# Patient Record
Sex: FEMALE | Race: OTHER | HISPANIC OR LATINO
[De-identification: names, ages, dates, MRNs, and addresses within clinical notes are randomized per-mention and may not be internally consistent; named-entity substitution may affect disease eponyms.]

---

## 2017-07-19 PROBLEM — Z00.00 ENCOUNTER FOR PREVENTIVE HEALTH EXAMINATION: Status: ACTIVE | Noted: 2017-07-19

## 2020-10-13 ENCOUNTER — APPOINTMENT (OUTPATIENT)
Dept: DERMATOLOGY | Facility: CLINIC | Age: 40
End: 2020-10-13
Payer: COMMERCIAL

## 2020-10-13 PROCEDURE — 99203 OFFICE O/P NEW LOW 30 MIN: CPT

## 2020-10-27 ENCOUNTER — APPOINTMENT (OUTPATIENT)
Dept: ORTHOPEDIC SURGERY | Facility: CLINIC | Age: 40
End: 2020-10-27
Payer: COMMERCIAL

## 2020-10-27 VITALS
HEART RATE: 73 BPM | HEIGHT: 64 IN | SYSTOLIC BLOOD PRESSURE: 110 MMHG | WEIGHT: 137 LBS | DIASTOLIC BLOOD PRESSURE: 75 MMHG | BODY MASS INDEX: 23.39 KG/M2

## 2020-10-27 DIAGNOSIS — M24.812 OTHER SPECIFIC JOINT DERANGEMENTS OF LEFT SHOULDER, NOT ELSEWHERE CLASSIFIED: ICD-10-CM

## 2020-10-27 DIAGNOSIS — Z78.9 OTHER SPECIFIED HEALTH STATUS: ICD-10-CM

## 2020-10-27 PROCEDURE — 99203 OFFICE O/P NEW LOW 30 MIN: CPT

## 2020-10-27 PROCEDURE — 73030 X-RAY EXAM OF SHOULDER: CPT | Mod: LT

## 2020-10-27 PROCEDURE — 99072 ADDL SUPL MATRL&STAF TM PHE: CPT
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
## 2020-10-27 NOTE — PHYSICAL EXAM
[de-identified] : Physical Exam:\par General: Well appearing, no acute distress, A&O\par Neurologic: A&Ox3, No focal deficits\par Head: NCAT without abrasions, lacerations, or ecchymosis to head, face, or scalp\par Eyes: No scleral icterus, no gross abnormalities\par Respiratory: Equal chest wall expansion bilaterally, no accessory muscle use\par Lymphatic: No lymphadenopathy palpated\par Skin: Warm and dry\par Psychiatric: Normal mood and affect\par \par Left Shoulder\par ·	Inspection/Palpation: no tenderness, swelling or deformities\par ·	Range of Motion: no crepitus with ROM; Active ; ER at side 25; IR to back pocket; Passive  pain; ER at side 35 with pain; IR to L4\par ·	Strength: forward elevation in scapular plane [4/5], internal rotation [4/5], external rotation [4/5], adduction [4/5] and abduction [4/5]\par ·	Stability: no joint instability on provocative testing\par ·	Tests: Melton test positive, Neer positive, positive drop arm test secondary to pain, bear hug test positive, Napolean sign negative, cross arm adduction negative, lift off sign positive, hornblowers sign negative, speeds test negative, Yergason's test negative, no bicipital groove tenderness, Cates's Active Compression test negative, whipple test positive, bicep's load II test negative\par \par Right Shoulder\par ·	Inspection/Palpation: no tenderness, swelling or deformities\par ·	Range of Motion: full and painless in all planes, no crepitus\par ·	Strength: forward elevation in scapular plane 5/5, internal rotation 5/5, external rotation 5/5, adduction 5/5 and abduction 5/5\par ·	Stability: no joint instability on provocative testing\par ·	Tests: Melton test negative, Neer sign negative, negative drop arm test secondary to pain, bear hug test negative, Napolean sign negative, cross arm adduction negative, lift off sign positive, hornblowers sign negative, speeds test negative, Yergason's test negative, no bicipital groove tenderness, Cates's Active Compression test negative [de-identified] :  4 views of the left shoulder were performed today and available for me to review. They demonstrate no fracture or dislocation. [No] glenohumeral degenerative changes noted. [No] AC joint degenerative changes noted. No gross deformities noted.

## 2020-10-27 NOTE — DISCUSSION/SUMMARY
[de-identified] : Francesca is a 40-year-old female with worsening left shoulder pain pain x1 and half years.  She stated injury to her left shoulder March 2018.  She has attempted conservative measures with physical therapy cortisone injections and oral anti-inflammatories over the past year and a half and has had no relief.  At this time I recommend an MRI of the shoulder.  I will call with the results.  All questions were answered she agrees with the above plan plan.

## 2020-10-27 NOTE — REASON FOR VISIT
[Initial Visit] : an initial visit for [Family Member] : family member [FreeTextEntry2] : left shoulder pain.

## 2020-10-27 NOTE — REVIEW OF SYSTEMS
[Joint Pain] : joint pain [Joint Stiffness] : joint stiffness [Sleep Disturbances] : ~T sleep disturbances [FreeTextEntry9] : left shoulder

## 2020-11-19 ENCOUNTER — TRANSCRIPTION ENCOUNTER (OUTPATIENT)
Age: 40
End: 2020-11-19

## 2020-11-23 ENCOUNTER — TRANSCRIPTION ENCOUNTER (OUTPATIENT)
Age: 40
End: 2020-11-23

## 2020-11-30 ENCOUNTER — TRANSCRIPTION ENCOUNTER (OUTPATIENT)
Age: 40
End: 2020-11-30

## 2020-12-24 ENCOUNTER — TRANSCRIPTION ENCOUNTER (OUTPATIENT)
Age: 40
End: 2020-12-24

## 2020-12-30 ENCOUNTER — OUTPATIENT (OUTPATIENT)
Dept: OUTPATIENT SERVICES | Facility: HOSPITAL | Age: 40
LOS: 1 days | End: 2020-12-30

## 2020-12-30 ENCOUNTER — APPOINTMENT (OUTPATIENT)
Dept: MRI IMAGING | Facility: CLINIC | Age: 40
End: 2020-12-30
Payer: COMMERCIAL

## 2020-12-30 DIAGNOSIS — M24.812 OTHER SPECIFIC JOINT DERANGEMENTS OF LEFT SHOULDER, NOT ELSEWHERE CLASSIFIED: ICD-10-CM

## 2020-12-30 PROCEDURE — 73221 MRI JOINT UPR EXTREM W/O DYE: CPT | Mod: 26,LT

## 2021-01-11 ENCOUNTER — TRANSCRIPTION ENCOUNTER (OUTPATIENT)
Age: 41
End: 2021-01-11

## 2021-01-18 ENCOUNTER — TRANSCRIPTION ENCOUNTER (OUTPATIENT)
Age: 41
End: 2021-01-18

## 2021-01-25 ENCOUNTER — TRANSCRIPTION ENCOUNTER (OUTPATIENT)
Age: 41
End: 2021-01-25

## 2021-02-08 ENCOUNTER — APPOINTMENT (OUTPATIENT)
Dept: ORTHOPEDIC SURGERY | Facility: CLINIC | Age: 41
End: 2021-02-08
Payer: COMMERCIAL

## 2021-02-08 DIAGNOSIS — M67.912 UNSPECIFIED DISORDER OF SYNOVIUM AND TENDON, LEFT SHOULDER: ICD-10-CM

## 2021-02-08 PROCEDURE — 99214 OFFICE O/P EST MOD 30 MIN: CPT | Mod: 25

## 2021-02-08 PROCEDURE — 99072 ADDL SUPL MATRL&STAF TM PHE: CPT

## 2021-02-08 PROCEDURE — 20610 DRAIN/INJ JOINT/BURSA W/O US: CPT | Mod: LT

## 2021-02-08 NOTE — PROCEDURE
[de-identified] : Injection: Left shoulder (Subacromial).\par Indication: Rotator cuff tendinitis partial-thickness tear.\par \par A discussion was had with the patient regarding this procedure and all questions were answered. All risks, benefits and alternatives were discussed. These included but were not limited to bleeding, infection, and allergic reaction. Alcohol was used to clean the skin, and betadine was used to sterilize and prep the area in the posterior aspect of the left shoulder. Ethyl chloride spray was then used as a topical anesthetic. A 21-gauge needle was used to inject 4cc of 1% lidocaine and 1cc of 40mg/mL triamcinolone acetonide into the left subacromial space. A sterile bandage was then applied. The patient tolerated the procedure well and there were no complications.

## 2021-02-08 NOTE — DISCUSSION/SUMMARY
[de-identified] : Francesca is a 40-year-old female with left shoulder pain secondary to partial-thickness tearing of the supraspinatus.  Today after discussion with her and her daughter regarding the nature symptoms as well as all treatment options.  Discussed operative and nonoperative management.  She elected for cortisone injection today.  She tolerated procedure well.  I will see her back in 6 to 8 weeks for clinical reassessment.  A physical therapy prescription was also provided to her today.  With regards to her trapezial strain I believe physical therapy will offer significant benefit.  All questions were answered she agrees with above plan.

## 2021-02-08 NOTE — REVIEW OF SYSTEMS
[Joint Pain] : joint pain [Joint Stiffness] : joint stiffness [Sleep Disturbances] : ~T sleep disturbances [Negative] : Heme/Lymph [FreeTextEntry9] : left shoulder

## 2021-02-08 NOTE — PHYSICAL EXAM
[de-identified] : Physical Exam:\par General: Well appearing, no acute distress, A&O\par Neurologic: A&Ox3, No focal deficits\par Head: NCAT without abrasions, lacerations, or ecchymosis to head, face, or scalp\par Eyes: No scleral icterus, no gross abnormalities\par Respiratory: Equal chest wall expansion bilaterally, no accessory muscle use\par Lymphatic: No lymphadenopathy palpated\par Skin: Warm and dry\par Psychiatric: Normal mood and affect\par Cervical spine: Left shoulder appears to be sitting anteriorly lower than right shoulder.  Tenderness palpation over the trapezius muscles as well as paraspinals.\par Left Shoulder\par ·	Inspection/Palpation: no tenderness, swelling or deformities\par ·	Range of Motion: no crepitus with ROM; Active ; ER at side 25; IR to back pocket; Passive  pain; ER at side 35 with pain; IR to L4\par ·	Strength: forward elevation in scapular plane [4/5], internal rotation [4/5], external rotation [4/5], adduction [4/5] and abduction [4/5]\par ·	Stability: no joint instability on provocative testing\par ·	Tests: Melton test positive, Neer positive, positive drop arm test secondary to pain, bear hug test positive, Napolean sign negative, cross arm adduction negative, lift off sign positive, hornblowers sign negative, speeds test negative, Yergason's test negative, no bicipital groove tenderness, Cates's Active Compression test negative, whipple test positive, bicep's load II test negative\par \par Right Shoulder\par ·	Inspection/Palpation: no tenderness, swelling or deformities\par ·	Range of Motion: full and painless in all planes, no crepitus\par ·	Strength: forward elevation in scapular plane 5/5, internal rotation 5/5, external rotation 5/5, adduction 5/5 and abduction 5/5\par ·	Stability: no joint instability on provocative testing\par ·	Tests: Melton test negative, Neer sign negative, negative drop arm test secondary to pain, bear hug test negative, Napolean sign negative, cross arm adduction negative, lift off sign positive, hornblowers sign negative, speeds test negative, Yergason's test negative, no bicipital groove tenderness, Cates's Active Compression test negative [de-identified] :   \par MR Shoulder Joint No Cont, Left     \par \par IMPRESSION:\par 1. Mild to moderate rotator cuff tendinopathy with articular and bursal surface fraying of the supraspinatus and slight intrasubstance tearing of the infraspinatus.\par
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
## 2021-02-08 NOTE — REASON FOR VISIT
[Follow-Up Visit] : a follow-up visit for [Family Member] : family member [FreeTextEntry2] : L shoulder pain.

## 2021-03-29 ENCOUNTER — APPOINTMENT (OUTPATIENT)
Dept: ORTHOPEDIC SURGERY | Facility: CLINIC | Age: 41
End: 2021-03-29
Payer: COMMERCIAL

## 2021-03-29 VITALS
HEIGHT: 64 IN | HEART RATE: 81 BPM | DIASTOLIC BLOOD PRESSURE: 79 MMHG | WEIGHT: 137 LBS | BODY MASS INDEX: 23.39 KG/M2 | SYSTOLIC BLOOD PRESSURE: 117 MMHG | OXYGEN SATURATION: 98 %

## 2021-03-29 DIAGNOSIS — M75.112 INCOMPLETE ROTATOR CUFF TEAR OR RUPTURE OF LEFT SHOULDER, NOT SPECIFIED AS TRAUMATIC: ICD-10-CM

## 2021-03-29 DIAGNOSIS — S46.812A STRAIN OF OTHER MUSCLES, FASCIA AND TENDONS AT SHOULDER AND UPPER ARM LEVEL, LEFT ARM, INITIAL ENCOUNTER: ICD-10-CM

## 2021-03-29 PROCEDURE — 99072 ADDL SUPL MATRL&STAF TM PHE: CPT

## 2021-03-29 PROCEDURE — 99214 OFFICE O/P EST MOD 30 MIN: CPT

## 2021-03-29 NOTE — DISCUSSION/SUMMARY
[de-identified] : Francesca is a 41-year-old female who comes in for follow-up of her bilateral shoulder pain.  Last visit we attempted cortisone injections which gave her no relief in her shoulder.  She complains of significant posterior shoulder and upper neck pain as well.  I am concerned that her shoulder pain is likely referred pain at with cervical origin.  At this time recommend an MRI of the cervical spine and follow-up with my partner Dr. Gannon.  I will see her back as needed.  She demonstrates understanding plan all questions were answered.
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
## 2021-03-29 NOTE — PHYSICAL EXAM
[de-identified] : Physical Exam:\par General: Well appearing, no acute distress, A&O\par Neurologic: A&Ox3, No focal deficits\par Head: NCAT without abrasions, lacerations, or ecchymosis to head, face, or scalp\par Eyes: No scleral icterus, no gross abnormalities\par Respiratory: Equal chest wall expansion bilaterally, no accessory muscle use\par Lymphatic: No lymphadenopathy palpated\par Skin: Warm and dry\par Psychiatric: Normal mood and affect\par Cervical spine: Left shoulder appears to be sitting anteriorly lower than right shoulder.  Tenderness palpation over the trapezius muscles as well as paraspinals.\par Left Shoulder\par ·	Inspection/Palpation: no tenderness, swelling or deformities\par ·	Range of Motion: no crepitus with ROM; Active ; ER at side 25; IR to back pocket; Passive  pain; ER at side 35 with pain; IR to L4\par ·	Strength: forward elevation in scapular plane [4/5], internal rotation [4/5], external rotation [4/5], adduction [4/5] and abduction [4/5]\par ·	Stability: no joint instability on provocative testing\par ·	Tests: Melton test positive, Neer positive, positive drop arm test secondary to pain, bear hug test positive, Napolean sign negative, cross arm adduction negative, lift off sign positive, hornblowers sign negative, speeds test negative, Yergason's test negative, no bicipital groove tenderness, Cates's Active Compression test negative, whipple test positive, bicep's load II test negative\par \par Right Shoulder\par ·	Inspection/Palpation: no tenderness, swelling or deformities\par ·	Range of Motion: full and painless in all planes, no crepitus\par ·	Strength: forward elevation in scapular plane 5/5, internal rotation 5/5, external rotation 5/5, adduction 5/5 and abduction 5/5\par ·	Stability: no joint instability on provocative testing\par ·	Tests: Melton test negative, Neer sign negative, negative drop arm test secondary to pain, bear hug test negative, Napolean sign negative, cross arm adduction negative, lift off sign positive, hornblowers sign negative, speeds test negative, Yergason's test negative, no bicipital groove tenderness, Cates's Active Compression test negative

## 2021-04-21 ENCOUNTER — OUTPATIENT (OUTPATIENT)
Dept: OUTPATIENT SERVICES | Facility: HOSPITAL | Age: 41
LOS: 1 days | End: 2021-04-21

## 2021-04-21 ENCOUNTER — APPOINTMENT (OUTPATIENT)
Dept: MRI IMAGING | Facility: CLINIC | Age: 41
End: 2021-04-21
Payer: COMMERCIAL

## 2021-04-21 DIAGNOSIS — M54.12 RADICULOPATHY, CERVICAL REGION: ICD-10-CM

## 2021-04-21 PROCEDURE — 72141 MRI NECK SPINE W/O DYE: CPT | Mod: 26

## 2021-04-25 ENCOUNTER — EMERGENCY (EMERGENCY)
Facility: HOSPITAL | Age: 41
LOS: 1 days | Discharge: HOME | End: 2021-04-25
Attending: EMERGENCY MEDICINE
Payer: COMMERCIAL

## 2021-04-25 VITALS
WEIGHT: 143.08 LBS | HEIGHT: 60 IN | HEART RATE: 65 BPM | RESPIRATION RATE: 18 BRPM | DIASTOLIC BLOOD PRESSURE: 80 MMHG | OXYGEN SATURATION: 100 % | TEMPERATURE: 98 F | SYSTOLIC BLOOD PRESSURE: 123 MMHG

## 2021-04-25 PROCEDURE — 99283 EMERGENCY DEPT VISIT LOW MDM: CPT

## 2021-04-25 PROCEDURE — 99284 EMERGENCY DEPT VISIT MOD MDM: CPT

## 2021-04-25 PROCEDURE — 99053 MED SERV 10PM-8AM 24 HR FAC: CPT

## 2021-04-25 RX ADMIN — Medication 1 TABLET(S): at 22:26

## 2021-04-25 NOTE — ED PROVIDER NOTE - PATIENT PORTAL LINK FT
You can access the FollowMyHealth Patient Portal offered by [facility] by registering at the following website: [email or web address]. By joining [facility]’s FollowMyHealth portal, you will also be able to view your health information using other applications (apps) compatible with our system.
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
## 2021-05-12 ENCOUNTER — APPOINTMENT (OUTPATIENT)
Dept: PHYSICAL MEDICINE AND REHAB | Facility: CLINIC | Age: 41
End: 2021-05-12

## 2021-05-24 ENCOUNTER — APPOINTMENT (OUTPATIENT)
Dept: PHYSICAL MEDICINE AND REHAB | Facility: CLINIC | Age: 41
End: 2021-05-24
Payer: COMMERCIAL

## 2021-05-24 VITALS
BODY MASS INDEX: 23.39 KG/M2 | SYSTOLIC BLOOD PRESSURE: 110 MMHG | TEMPERATURE: 97 F | WEIGHT: 137 LBS | RESPIRATION RATE: 14 BRPM | HEART RATE: 57 BPM | OXYGEN SATURATION: 98 % | HEIGHT: 64 IN | DIASTOLIC BLOOD PRESSURE: 70 MMHG

## 2021-05-24 DIAGNOSIS — Z78.9 OTHER SPECIFIED HEALTH STATUS: ICD-10-CM

## 2021-05-24 PROCEDURE — 99204 OFFICE O/P NEW MOD 45 MIN: CPT

## 2021-05-24 RX ORDER — METHYLPREDNISOLONE 4 MG/1
4 TABLET ORAL
Qty: 1 | Refills: 0 | Status: DISCONTINUED | COMMUNITY
Start: 2021-03-29 | End: 2021-05-24

## 2021-05-24 RX ORDER — GABAPENTIN 100 MG/1
100 CAPSULE ORAL
Qty: 90 | Refills: 0 | Status: ACTIVE | COMMUNITY
Start: 2021-05-24 | End: 1900-01-01

## 2021-05-24 NOTE — ASSESSMENT
[FreeTextEntry1] : Ms. WILIAM DE LOS SANTOS is a 41 year old female who presents with persistent neck/shoulder/upper back pain, much worse on the left, likely related to an underlying cervical radiculopathy. Denies any red flag signs. Will recommend:\par - Will start trial of Gabapentin 100mg Qhs with gradual increase to 300mg Qhs. Patient aware of side effects and risks including sedation, leg swelling, and possible mood changes. \par - Briefly discussed SAJAN with patient for which she would like to hold off for now until we try the Gabapentin\par \par RTC in 2 weeks. Patient aware of red flag signs including any changes to their bowel/bladder control, groin numbness or new weakness. Patient knows to seek immediate attention by calling 911 or going to nearest ER if these symptoms appear.

## 2021-05-24 NOTE — PHYSICAL EXAM
[FreeTextEntry1] : PE:\par Constitutional: In NAD, calm and cooperative\par HEENT: NCAT, Anicteric sclera, no lid-lag\par Cardio: Extremities appear pink and well perfused, no peripheral edema\par Respiratory: Normal respiratory effort on room air, no accessory muscle use\par Skin: no rashes seen on exposed skin, no visible abrasions\par Psych: Normal affect, intact judgment and insight\par Neuro: Awake, alert and oriented x 3, see below for focused neurological exam\par MSK (Neck):\par 	Inspection: no gross swelling identified\par 	Palpation: Tenderness of the bilateral, L>R, cervical paraspinals and over R>L upper trapezius region\par 	ROM: Mild pain at end cervical extension\par 	Strength: 5/5 strength in bilateral upper extremities\par 	Reflexes: 2+ Biceps/Brachioradialis/patella/achilles reflex bilaterally, Mai’s/clonus negative bilaterally\par 	Sensation: Intact to light touch in bilateral upper extremities\par 	Special tests: Spurling’s test negative bilaterally

## 2021-05-24 NOTE — REASON FOR VISIT
[Initial Evaluation] : an initial evaluation [FreeTextEntry1] : [id number] [TWNoteComboBox1] : [place]

## 2021-05-24 NOTE — DATA REVIEWED
[FreeTextEntry1] : C Spine MRI Reviewed: multilevel spondylosis\par \par   MR Cervical Spine No Cont             Final\par \par No Documents Attached\par \par \par \par \par   EXAM:  MR SPINE CERVICAL\par \par \par PROCEDURE DATE:  04/21/2021\par \par \par \par INTERPRETATION:  EXAMINATION: MRI of the cervical spine without contrast\par \par CLINICAL INFORMATION: Neck pain and left arm pain\par \par TECHNIQUE: Multiplanar, multisequential MR imaging was performed.\par \par FINDINGS: The cervical cord is normal in signal. Vertebral body heights are maintained. There is slight reversal normal cervical lordosis. Alignment is otherwise normal.\par \par There is multilevel disc degeneration.\par \par C2-C3: No spinal canal stenosis or foraminal narrowing.\par \par C3-C4: No spinal canal stenosis or foraminal narrowing.\par \par C4-C5: Small broad-based right foraminal disc osteophyte complex. Mild right foraminal narrowing. No spinal canal stenosis.\par \par C5-C6: Broad-based left foraminal disc osteophyte complex which effaces the anterior thecal sac and minimally flattens the ventral cord. This is superimposed on a small disc bulge. Moderate to severe left foraminal narrowing. Moderate right foraminal narrowing. Mild spinal canal stenosis.\par \par C6-C7: No bulging or herniated intervertebral disc. No spinal canal stenosis or foraminal narrowing.\par \par C7-T1: No bulging or herniated intervertebral disc. No spinal canal stenosis or foraminal narrowing.\par \par There is no paraspinal muscle atrophy or edema.\par \par IMPRESSION: Broad-based left foraminal disc osteophyte complex at C5-C6 which effaces the anterior thecal sac and minimally flattens the ventral cord. Mild C5-C6 spinal canal stenosis with moderate to severe left foraminal narrowing.\par \par Small right foraminal disc osteophyte complex at C4-C5 with mild right foraminal narrowing.\par \par \par \par \par \par \par ASUNCION BILLS MD; Attending Radiologist\par This document has been electronically signed. Apr 22 2021  2:11PM\par \par  \par \par  Ordered by: CRIS ESPINOZA       Collected/Examined: 21Apr2021 08:28AM       \par Verified by: CRIS ESPINOZA [id number] 02:49PM       \par  Result Communication: No patient communication needed at this time;\par Stage: Final       \par  Performed at: [facility] [place]       Resulted: [id number] 02:06PM       Last Updated: 07May2021 02:49PM       Accession: [id number]

## 2021-05-24 NOTE — HISTORY OF PRESENT ILLNESS
[FreeTextEntry1] : Ms. WILIAM DE LOS SANTOS  is a 41 year old female who presents with neck pain. Patient referred by Dr. Zamorano. \par \par Location:Bilateral neck/shoulder pain, L>>R\par Onset:Pain started about 2 years ago after she fell in the supermarket\par Provocation/Palliative:Worse with activity (walking) but also present at rest.\par Quality:Sharp\par Radiation:To the back of both arms\par Severity:moderate to severe\par Timing:Pain is usually worst after work\par \par Denies any associated numbness. Denies any associated arm or hand weakness. Denies any loss of bowel/bladder control or any groin numbness.\par Previous medications trialed:medrol dose david, Advil\par Previous procedures relevant to complaint:Shoulder injections x 2 without significant relief\par Has tried conservative treatment?:Physical therapy without significant relief\par

## 2021-06-09 ENCOUNTER — APPOINTMENT (OUTPATIENT)
Dept: PHYSICAL MEDICINE AND REHAB | Facility: CLINIC | Age: 41
End: 2021-06-09
Payer: COMMERCIAL

## 2021-06-09 VITALS
HEIGHT: 64 IN | TEMPERATURE: 97 F | BODY MASS INDEX: 23.56 KG/M2 | RESPIRATION RATE: 14 BRPM | SYSTOLIC BLOOD PRESSURE: 109 MMHG | WEIGHT: 138 LBS | DIASTOLIC BLOOD PRESSURE: 68 MMHG | OXYGEN SATURATION: 99 % | HEART RATE: 81 BPM

## 2021-06-09 PROCEDURE — 99214 OFFICE O/P EST MOD 30 MIN: CPT

## 2021-06-10 NOTE — ASSESSMENT
[FreeTextEntry1] : Ms. WILIAM DE LOS SANTOS is a 41 year old female who presents with persistent neck/shoulder/upper back pain, much worse on the left, likely related to an underlying cervical radiculopathy. Pain not improved with conservative measures. Denies any red flag signs. Will recommend:\par - Discussed with patient the risks (including but not limited to bleeding, infection, nerve damage, etc), benefits and alternatives to an epidural steroid injection for which patient understands. Will plan for a left C7-T1 ILESI. Will need COVID test done prior. \par \par Return for procedure.

## 2021-06-10 NOTE — HISTORY OF PRESENT ILLNESS
[FreeTextEntry1] : Ms. WILIAM DE LOS SANTOS is a 41 year old  female who presents for follow up. At last visit, patient was started on gabapentin. Patient says the gabapentin helps falling asleep but not much with pain. Only taking 100mg Qhs due to 200mg Qhs making her too sedated the next day. Denies any other new symptoms. \par \par Location:Bilateral neck/shoulder pain, L>>R\par Onset:Pain started about 2 years ago after she fell in the supermarket\par Provocation/Palliative:Worse with activity (walking) but also present at rest.\par Quality:Sharp\par Radiation:To the back of both arms, L>R. \par Severity:moderate to severe\par Timing:Pain is usually worst after work\par \par No bowel/bladder changes. No groin numbness.

## 2021-08-26 ENCOUNTER — APPOINTMENT (OUTPATIENT)
Dept: PHYSICAL MEDICINE AND REHAB | Facility: CLINIC | Age: 41
End: 2021-08-26
Payer: COMMERCIAL

## 2021-08-26 VITALS
HEIGHT: 64 IN | BODY MASS INDEX: 23.39 KG/M2 | DIASTOLIC BLOOD PRESSURE: 75 MMHG | SYSTOLIC BLOOD PRESSURE: 113 MMHG | HEART RATE: 76 BPM | RESPIRATION RATE: 14 BRPM | WEIGHT: 137 LBS

## 2021-08-26 PROCEDURE — 99214 OFFICE O/P EST MOD 30 MIN: CPT | Mod: 25,GC

## 2021-08-26 PROCEDURE — 20553 NJX 1/MLT TRIGGER POINTS 3/>: CPT

## 2021-08-26 NOTE — ASSESSMENT
[FreeTextEntry1] : Ms. WILIAM DE LOS SANTOS is a 41 year old female who presents with persistent neck/shoulder/upper back pain, much worse on the left, likely related to an underlying cervical radiculopathy. Pain not improved with conservative measures. Patient also has a myositis component to her pain now. Denies any red flag signs. Will recommend:\par - TPI done today. Patient tolerated procedure and had some pain relief immediately following the TPI\par - Discussed again with patient the risks (including but not limited to bleeding, infection, nerve damage, etc), benefits and alternatives to an epidural steroid injection for which patient understands. Patient is medically indicated for a cervical SAJAN due to significant imaging findings that correlate with patient's symptoms (left foraminal osteophyte effacing the anterior thecal sac on cervical MRI) and failed at least 3 months of conservative therapy including PT and multiple medications. Will plan for a left C7-T1 ILESI again. Will need COVID test done prior. Patient was advised to stop Advil at least 2 days prior and not to take any other pain medications during that time for which she agreed. \par - Referred to ortho spine for surgical consultation given persistent pain and other treatment options if SAJAN does not improve her pain significantly. \par - Continue Advil PRN with food.\par \par Return for procedure.

## 2021-08-26 NOTE — HISTORY OF PRESENT ILLNESS
[FreeTextEntry1] : Ms. WILIAM DE LOS SANTOS is a 41 year old  female who presents for follow up. At last visit, patient was recommended to undergo a cervical SAJAN. HPI obtained via  #[id number]. Her insurance denied SAJAN. Patient c/o pain is more frequent, now daily. She has been taking 2-3 Advil daily for the pain without significant relief. Patient also now complaining of tightness and spasm in her left upper back area\par \par Location:Bilateral neck/shoulder pain, L>>R\par Onset:Pain started about 2 years ago after she fell in the supermarket\par Provocation/Palliative:Worse with activity (walking) but also present at rest.\par Quality:Sharp\par Radiation:To the back of both arms, L>R. \par Severity:moderate to severe, 8/10 currently\par Timing:Pain is usually worst after work and in the morning, pain now occurring daily\par \par No bowel/bladder changes. No groin numbness.

## 2021-08-26 NOTE — PROCEDURE
[de-identified] : Reason for procedure: Myositis\par \par Procedure: Trigger Point Injections\par Physician: Dr. Gannon\par Medication injected: Lidocaine 1%, 3cc total\par Sedation medications: None\par Estimated blood loss: None\par Complications: None\par \par Technique: R/B/A to trigger point injection reviewed with patient. The patient is agreeable and wishes to proceed. The patient was placed in seated position and trigger points of left trapezius, levator scapulae and cervical paraspinals were identified. The area was prepped in normal sterile fashion with Chloroprep. A 27 gauge 1.25 inch needle was advanced into the palpable trigger points with reproduction of pain. After negative aspiration of heme, the above medications were injected into the trigger areas. Needle was then removed. No blood at injection sites. There was no complications, the patient was provided with post injection instructions.\par

## 2021-08-26 NOTE — PHYSICAL EXAM
[FreeTextEntry1] : PE:\par Constitutional: In NAD, calm and cooperative\par HEENT: NCAT, Anicteric sclera, no lid-lag\par Cardio: Extremities appear pink and well perfused, no peripheral edema\par Respiratory: Normal respiratory effort on room air, no accessory muscle use\par Skin: no rashes seen on exposed skin, no visible abrasions\par Psych: Normal affect, intact judgment and insight\par Neuro: Awake, alert and oriented x 3, see below for focused neurological exam\par MSK (Neck):\par 	Inspection: no gross swelling identified\par 	Palpation: Tenderness of the bilateral, L>R, cervical paraspinals and over L>R upper trapezius region, multiple trigger points identified\par 	ROM: Mild pain at end cervical extension\par 	Strength: 5/5 strength in bilateral upper extremities\par 	Reflexes: 2+ Biceps/Brachioradialis/patella/achilles reflex bilaterally, Mai’s/clonus negative bilaterally\par 	Sensation: Intact to light touch in bilateral upper extremities\par 	Special tests: Spurling’s test negative bilaterally

## 2021-09-21 ENCOUNTER — APPOINTMENT (OUTPATIENT)
Dept: ORTHOPEDIC SURGERY | Facility: CLINIC | Age: 41
End: 2021-09-21
Payer: COMMERCIAL

## 2021-09-21 VITALS
HEIGHT: 64 IN | DIASTOLIC BLOOD PRESSURE: 67 MMHG | HEART RATE: 78 BPM | WEIGHT: 136 LBS | SYSTOLIC BLOOD PRESSURE: 103 MMHG | BODY MASS INDEX: 23.22 KG/M2

## 2021-09-21 DIAGNOSIS — M50.20 OTHER CERVICAL DISC DISPLACEMENT, UNSPECIFIED CERVICAL REGION: ICD-10-CM

## 2021-09-21 DIAGNOSIS — M54.12 RADICULOPATHY, CERVICAL REGION: ICD-10-CM

## 2021-09-21 PROCEDURE — 72040 X-RAY EXAM NECK SPINE 2-3 VW: CPT

## 2021-09-21 PROCEDURE — 99215 OFFICE O/P EST HI 40 MIN: CPT

## 2021-09-21 NOTE — ADDENDUM
[FreeTextEntry1] : Documented by Mk Blanc acting as a scribe for Rosalva Avelar on 09/21/2021 . All medical record entries made by the Scribe were at my, Rosalva Avelar , direction and personally dictated by me on 09/21/2021  . I have reviewed the chart and agree that the record accurately reflects my personal performance of the history, physical exam, assessment and plan. I have also personally directed, reviewed, and agreed with the chart.

## 2021-09-21 NOTE — PHYSICAL EXAM
[Poor Appearance] : well-appearing [Acute Distress] : not in acute distress [Obese] : not obese [de-identified] : CONSTITUTIONAL: Patient is a very pleasant individual who is well-nourished and appears stated age. \par PSYCHIATRIC: Alert and oriented times three and in no apparent distress, and participates with orthopedic evaluation well.\par HEAD: Atraumatic and nonsyndromic in appearance.\par EENT: No thyromegaly, EOMI.\par RESPIRATORY: Respiratory rate is regular, not dyspneic on examination.\par LYMPHATICS: There is no cervical or axillary lymphadenopathy.\par INTEGUMENTARY: Skin is clean, dry, and intact about the bilateral upper extremities and cervical spine. \par VASCULAR: There is brisk capillary refill about the bilateral upper extremities and radial pulses are 2/4. \par NEUROLOGIC: + L'hirmitte, negative Spurling’s sign. There are no pathologic reflexes. There is no decrease in sensation of the bilateral upper extremities on Wartenberg pinwheel examination. Deep tendon reflexes are well-maintained at +2/4 of the bilateral upper extremities and are symmetric.\par MUSCULOSKELETAL: There is no visible muscular atrophy. The patient ambulates in a non-myelopathic manner. Normal secondary orthopaedic exam of bilateral shoulders, elbows and hands. Pain with palpation to the trapezius and deltiod L > R, mechanical oriented cervical spine pain worse with extension and rotation to the right. Left hip flexor and triceps are 4/5.  [de-identified] : Xray of a cervical spine taken 09/21/2021 demonstrates mild cervical degenerative disc disease at C5-C6. \par \par MRI of the cervical spine taken at [facility] on 04- demonstrates cervical degenerative disc disease at C4-C5 and a left paracentral disc protrusion at C5-C6\par

## 2021-09-21 NOTE — DISCUSSION/SUMMARY
[de-identified] : We talked about the nature of the condition and treatment options. Anticipatory guidance regarding cervical disc protrusions was given. \par \par Prior to appointment and during encounter with patient extensive medical records were reviewed including but not limited to, hospital records, out patient records, imaging results, and lab data. During this appointment the patient was examined, diagnoses were discussed and explained in a face to face manner. In addition extensive time was spent reviewing aforementioned diagnostic studies. Counseling including abnormal image results, differential diagnoses, treatment options, risk and benefits, lifestyle changes, current condition, and current medications was performed. Patient's comments, questions, and concerns were address and patient verbalized understanding. Based on this patient's presentation at our office, which is an orthopedic spine surgeon's office, this patient inherently / intrinsically has a risk, however minute, of developing  issues such as Cauda equina syndrome, bowel and bladder changes, or progression of motor or neurological deficits such as paralysis which may be permanent. \par \par ACDF at C4-C5 and C5-C6 was discussed. A cervical MRI and CT scan has been ordered and is medically necessary due to persistent pain, failure of conservative measures physical therapy and pain management injections since 09-, to assess the size of screws required for surgical intervention, to delineate surgical levels, and rule out OPLL and N2 gas formation. CT scan will help guide treatment plan, possible surgical intervention vs injection therapy with pain management. The patient will follow up after the CT scan results have been obtained. Anatomic models, Xrays, CT scans/MRI’s were utilized to provide a firm understanding of their surgical plan. Patient is aware that surgery is elective in nature and he choosing to proceed with surgery. Risks, benefits, alternatives were discussed and all questions, comments and concerns were encouraged and answered to the patient's satisfaction. The statistical probability of improvement was discussed at length as well as post surgical course. Literature from [employer] spine society was provided to the patient regarding the specific type of surgery as well as a 5 page written surgical consent which the patient will need to sign and return to the office prior to surgical date. Consent forms highlight specific complications related to the complex nature of spinal surgery.\par  \par Risks of cervical surgery include: dysphagia/difficulty swallowing, Dysphonia/altered voice, adjacent segment disease (which will require more surgery in the future), vascular compromise and stroke, and persistent pain.\par  \par Benefits of cervical surgery include Improved neurologic function and pain score\par  \par We also discussed with the patient complications of incisions directly related to obesity, diabetes, previous wound complications or post-surgical wound infections, smoking, neuropathy, and chronic anticoagulation. This risk has been specifically discussed and the patient will discuss modifiable risk factors to be optimized prior to surgical management. A multimodality approach of primary care physician, and medicine subspecialists will be utilized to optimize medical risk factors.\par  \par If patient is a smoker, discontinuation of smoking was advised and must be accomplished 6-8 weeks prior to surgery date. Patient was advised that help with quitting smoking is available through [place] [employer] [facility] and phone number/website was provided, or patient can ask assistance from primary care provider. Elective surgery will not be performed unless patient complies with this policy.

## 2021-09-21 NOTE — HISTORY OF PRESENT ILLNESS
[de-identified] : 41 year old F presents for an initial evaluation of neck pain beginning 2 years ago after a slip and fall, she out stretched her left arm leading to left shoulder pain with severe cervical pain and left scapula pain sometimes radiating down the LUE. she states the neck as stiff and vice like and the pain down the arm as burning. She has completed a full year of PT and several pain injections with no relief. 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 Pain is 9/10 after work. ROMERO questionnaire negative  Patient was referred by Dr. Gannon. She presents with MRIs for review. \par  [Ataxia] : no ataxia [Incontinence] : no incontinence [Loss of Dexterity] : good dexterity [Urinary Ret.] : no urinary retention

## 2021-09-30 ENCOUNTER — OUTPATIENT (OUTPATIENT)
Dept: OUTPATIENT SERVICES | Facility: HOSPITAL | Age: 41
LOS: 1 days | End: 2021-09-30

## 2021-09-30 ENCOUNTER — APPOINTMENT (OUTPATIENT)
Dept: CT IMAGING | Facility: CLINIC | Age: 41
End: 2021-09-30
Payer: COMMERCIAL

## 2021-09-30 ENCOUNTER — RESULT REVIEW (OUTPATIENT)
Age: 41
End: 2021-09-30

## 2021-09-30 ENCOUNTER — APPOINTMENT (OUTPATIENT)
Dept: MRI IMAGING | Facility: CLINIC | Age: 41
End: 2021-09-30
Payer: COMMERCIAL

## 2021-09-30 DIAGNOSIS — M54.12 RADICULOPATHY, CERVICAL REGION: ICD-10-CM

## 2021-09-30 PROCEDURE — 76376 3D RENDER W/INTRP POSTPROCES: CPT | Mod: 26

## 2021-09-30 PROCEDURE — 72141 MRI NECK SPINE W/O DYE: CPT | Mod: 26

## 2021-09-30 PROCEDURE — 72125 CT NECK SPINE W/O DYE: CPT | Mod: 26

## 2021-10-07 ENCOUNTER — OUTPATIENT (OUTPATIENT)
Dept: OUTPATIENT SERVICES | Facility: HOSPITAL | Age: 41
LOS: 1 days | End: 2021-10-07
Payer: COMMERCIAL

## 2021-10-07 VITALS
DIASTOLIC BLOOD PRESSURE: 60 MMHG | OXYGEN SATURATION: 96 % | RESPIRATION RATE: 20 BRPM | HEIGHT: 62 IN | SYSTOLIC BLOOD PRESSURE: 100 MMHG | TEMPERATURE: 98 F | WEIGHT: 138.89 LBS | HEART RATE: 65 BPM

## 2021-10-07 DIAGNOSIS — M54.12 RADICULOPATHY, CERVICAL REGION: ICD-10-CM

## 2021-10-07 DIAGNOSIS — Z98.890 OTHER SPECIFIED POSTPROCEDURAL STATES: Chronic | ICD-10-CM

## 2021-10-07 DIAGNOSIS — M47.812 SPONDYLOSIS WITHOUT MYELOPATHY OR RADICULOPATHY, CERVICAL REGION: ICD-10-CM

## 2021-10-07 DIAGNOSIS — M50.20 OTHER CERVICAL DISC DISPLACEMENT, UNSPECIFIED CERVICAL REGION: ICD-10-CM

## 2021-10-07 DIAGNOSIS — Z01.818 ENCOUNTER FOR OTHER PREPROCEDURAL EXAMINATION: ICD-10-CM

## 2021-10-07 DIAGNOSIS — Z29.9 ENCOUNTER FOR PROPHYLACTIC MEASURES, UNSPECIFIED: ICD-10-CM

## 2021-10-07 LAB
A1C WITH ESTIMATED AVERAGE GLUCOSE RESULT: 5.6 % — SIGNIFICANT CHANGE UP (ref 4–5.6)
ANION GAP SERPL CALC-SCNC: 11 MMOL/L — SIGNIFICANT CHANGE UP (ref 5–17)
APTT BLD: 28.4 SEC — SIGNIFICANT CHANGE UP (ref 27.5–35.5)
BASOPHILS # BLD AUTO: 0.03 K/UL — SIGNIFICANT CHANGE UP (ref 0–0.2)
BASOPHILS NFR BLD AUTO: 0.4 % — SIGNIFICANT CHANGE UP (ref 0–2)
BLD GP AB SCN SERPL QL: SIGNIFICANT CHANGE UP
BUN SERPL-MCNC: 16.8 MG/DL — SIGNIFICANT CHANGE UP (ref 8–20)
CALCIUM SERPL-MCNC: 9.4 MG/DL — SIGNIFICANT CHANGE UP (ref 8.6–10.2)
CHLORIDE SERPL-SCNC: 105 MMOL/L — SIGNIFICANT CHANGE UP (ref 98–107)
CO2 SERPL-SCNC: 24 MMOL/L — SIGNIFICANT CHANGE UP (ref 22–29)
CREAT SERPL-MCNC: 0.65 MG/DL — SIGNIFICANT CHANGE UP (ref 0.5–1.3)
EOSINOPHIL # BLD AUTO: 0.17 K/UL — SIGNIFICANT CHANGE UP (ref 0–0.5)
EOSINOPHIL NFR BLD AUTO: 2.5 % — SIGNIFICANT CHANGE UP (ref 0–6)
ESTIMATED AVERAGE GLUCOSE: 114 MG/DL — SIGNIFICANT CHANGE UP (ref 68–114)
GLUCOSE SERPL-MCNC: 103 MG/DL — HIGH (ref 70–99)
HCG UR QL: NEGATIVE — SIGNIFICANT CHANGE UP
HCT VFR BLD CALC: 37.7 % — SIGNIFICANT CHANGE UP (ref 34.5–45)
HGB BLD-MCNC: 12.7 G/DL — SIGNIFICANT CHANGE UP (ref 11.5–15.5)
IMM GRANULOCYTES NFR BLD AUTO: 0.3 % — SIGNIFICANT CHANGE UP (ref 0–1.5)
INR BLD: 1.1 RATIO — SIGNIFICANT CHANGE UP (ref 0.88–1.16)
LYMPHOCYTES # BLD AUTO: 2.21 K/UL — SIGNIFICANT CHANGE UP (ref 1–3.3)
LYMPHOCYTES # BLD AUTO: 32.5 % — SIGNIFICANT CHANGE UP (ref 13–44)
MCHC RBC-ENTMCNC: 31.8 PG — SIGNIFICANT CHANGE UP (ref 27–34)
MCHC RBC-ENTMCNC: 33.7 GM/DL — SIGNIFICANT CHANGE UP (ref 32–36)
MCV RBC AUTO: 94.3 FL — SIGNIFICANT CHANGE UP (ref 80–100)
MONOCYTES # BLD AUTO: 0.5 K/UL — SIGNIFICANT CHANGE UP (ref 0–0.9)
MONOCYTES NFR BLD AUTO: 7.4 % — SIGNIFICANT CHANGE UP (ref 2–14)
MRSA PCR RESULT.: SIGNIFICANT CHANGE UP
NEUTROPHILS # BLD AUTO: 3.86 K/UL — SIGNIFICANT CHANGE UP (ref 1.8–7.4)
NEUTROPHILS NFR BLD AUTO: 56.9 % — SIGNIFICANT CHANGE UP (ref 43–77)
PLATELET # BLD AUTO: 320 K/UL — SIGNIFICANT CHANGE UP (ref 150–400)
POTASSIUM SERPL-MCNC: 4.2 MMOL/L — SIGNIFICANT CHANGE UP (ref 3.5–5.3)
POTASSIUM SERPL-SCNC: 4.2 MMOL/L — SIGNIFICANT CHANGE UP (ref 3.5–5.3)
PROTHROM AB SERPL-ACNC: 12.7 SEC — SIGNIFICANT CHANGE UP (ref 10.6–13.6)
RBC # BLD: 4 M/UL — SIGNIFICANT CHANGE UP (ref 3.8–5.2)
RBC # FLD: 12.5 % — SIGNIFICANT CHANGE UP (ref 10.3–14.5)
S AUREUS DNA NOSE QL NAA+PROBE: SIGNIFICANT CHANGE UP
SODIUM SERPL-SCNC: 140 MMOL/L — SIGNIFICANT CHANGE UP (ref 135–145)
WBC # BLD: 6.79 K/UL — SIGNIFICANT CHANGE UP (ref 3.8–10.5)
WBC # FLD AUTO: 6.79 K/UL — SIGNIFICANT CHANGE UP (ref 3.8–10.5)

## 2021-10-07 PROCEDURE — G0463: CPT

## 2021-10-07 RX ORDER — SODIUM CHLORIDE 9 MG/ML
3 INJECTION INTRAMUSCULAR; INTRAVENOUS; SUBCUTANEOUS EVERY 8 HOURS
Refills: 0 | Status: DISCONTINUED | OUTPATIENT
Start: 2021-11-01 | End: 2021-11-03

## 2021-10-07 RX ORDER — INFLUENZA VIRUS VACCINE 15; 15; 15; 15 UG/.5ML; UG/.5ML; UG/.5ML; UG/.5ML
0.5 SUSPENSION INTRAMUSCULAR ONCE
Refills: 0 | Status: DISCONTINUED | OUTPATIENT
Start: 2021-10-07 | End: 2021-10-22

## 2021-10-07 NOTE — H&P PST ADULT - HISTORY OF PRESENT ILLNESS
40 y/o female presents today to PST pending anterior cervical disectomy and fusion on 10/25/21 with Dr. Corby Perla secondary to cervical region radiculopathy, cervical disc displacement and spondylosis without myelopathy radiculopathy of cervical region. Pt. denies significant past medical history. Pt. states she has had pain in her neck since 2019, she states she slipped in a supermarket and injured herself. She states she has had PT, and has tried other medications and injections since her injury which have provided her no relief. Pain in neck rated 8/10, on a daily basis, pain is constant and described as constant in her neck, pain has spread to her shoulders, she feels like her shoulders are falling forward and also feels like she is being hit in the back of her neck. Pain is relieved by advil, pain gives her HAs, and pain is worsened by being at work as the work is heavy and worse with the physical demands of her job. Admits to numbness/tingling when she wakes up of both arms intermittently, and sometimes just her right arm. Pt. denies CP or SOB. Pt. is s/p pfizer covid 19 x 1, second dose is scheduled for 10/12/21.

## 2021-10-07 NOTE — H&P PST ADULT - ASSESSMENT
40 y/o Greek speaking female presents today to PST pending anterior cervical disectomy and fusion on 10/25/21 with Dr. Corby Perla secondary to cervical region radiculopathy, cervical disc displacement and spondylosis without myelopathy radiculopathy of cervical region. Pt. denies significant past medical history. Pt. states she has had pain in her neck since 2019, she states she slipped in a supermarket and injured herself. She states she has had PT, and has tried other medications and injections since her injury which have provided her no relief. Pain in neck rated 8/10, on a daily basis, pain is constant and described as constant in her neck, pain has spread to her shoulders, she feels like her shoulders are falling forward and also feels like she is being hit in the back of her neck. Pain is relieved by advil, pain gives her HAs, and pain is worsened by being at work as the work is heavy and worse with the physical demands of her job. Admits to numbness/tingling when she wakes up of both arms intermittently, and sometimes just her right arm. Pt. denies CP or SOB. Pt. is s/p pfizer covid 19 x 1, second dose is scheduled for 10/12/21.  40 y/o Welsh speaking female presents today to PST pending anterior cervical disectomy and fusion on 10/25/21 with Dr. Corby Perla secondary to cervical region radiculopathy, cervical disc displacement and spondylosis without myelopathy radiculopathy of cervical region. Pt. denies significant past medical history. Pt. states she has had pain in her neck since 2019, she states she slipped in a supermarket and injured herself. She states she has had PT, and has tried other medications and injections since her injury which have provided her no relief. Pain in neck rated 8/10, on a daily basis, pain is constant and described as constant in her neck, pain has spread to her shoulders, she feels like her shoulders are falling forward and also feels like she is being hit in the back of her neck. Pain is relieved by advil, pain gives her HAs, and pain is worsened by being at work as the work is heavy and worse with the physical demands of her job. Admits to numbness/tingling when she wakes up of both arms intermittently, and sometimes just her right arm. Pt. denies CP or SOB. Pt. is s/p pfizer covid 19 x 1, second dose is scheduled for 10/12/21.     Patient educated on surgical scrub, COVID testing 10/22/21, preadmission instructions, and day of procedure medications as per policy, pt. verbalized agreement and understanding.   Pt instructed to stop vitamins/supplements/herbal medications/ASA/NSAIDS for one week prior to surgery as per policy and pt. verbalized agreement and understanding.   Pt. educated via both verbal and written means of communication regarding preoperative instructions and education as per policy, pt. verbalized agreement and understanding.     OPIOID RISK TOOL    MORRO EACH BOX THAT APPLIES AND ADD TOTALS AT THE END    FAMILY HISTORY OF SUBSTANCE ABUSE                 FEMALE         MALE                                                Alcohol                             [  ]1 pt          [  ]3pts                                               Illegal Durgs                     [  ]2 pts        [  ]3pts                                               Rx Drugs                           [  ]4 pts        [  ]4 pts    PERSONAL HISTORY OF SUBSTANCE ABUSE                                                                                          Alcohol                             [  ]3 pts       [  ]3 pts                                               Illegal Drugs                     [  ]4 pts        [  ]4 pts                                               Rx Drugs                           [  ]5 pts        [  ]5 pts    AGE BETWEEN 16-45 YEARS                                      [  x]1 pt         [  ]1 pt    HISTORY OF PREADOLESCENT   SEXUAL ABUSE                                                             [  ]3 pts        [  ]0pts    PSYCHOLOGICAL DISEASE                     ADD, OCD, Bipolar, Schizophrenia        [  ]2 pts         [  ]2 pts                      Depression                                               [  ]1 pt           [  ]1 pt           SCORING TOTAL   (add numbers and type here)              (0)                                     A score of 3 or lower indicated LOW risk for future opioid abuse  A score of 4 to 7 indicated moderate risk for future opioid abuse  A score of 8 or higher indicates a high risk for opioid abuse    CAPRINI SCORE    AGE RELATED RISK FACTORS                                                             [x ] Age 41-60 years                                            (1 Point)  [ ] Age: 61-74 years                                           (2 Points)                 [ ] Age= 75 years                                                (3 Points)             DISEASE RELATED RISK FACTORS                                                       [ ] Edema in the lower extremities                 (1 Point)                     [ ] Varicose veins                                               (1 Point)                                 [x ] BMI > 25 Kg/m2                                            (1 Point)                                  [ ] Serious infection (ie PNA)                            (1 Point)                     [ ] Lung disease ( COPD, Emphysema)            (1 Point)                                                                          [ ] Acute myocardial infarction                         (1 Point)                  [ ] Congestive heart failure (in the previous month)  (1 Point)         [ ] Inflammatory bowel disease                            (1 Point)                  [ ] Central venous access, PICC or Port               (2 points)       (within the last month)                                                                [ ] Stroke (in the previous month)                        (5 Points)    [ ] Previous or present malignancy                       (2 points)                                                                                                                                                         HEMATOLOGY RELATED FACTORS                                                         [ ] Prior episodes of VTE                                     (3 Points)                     [ ] Positive family history for VTE                      (3 Points)                  [ ] Prothrombin 62533 A                                     (3 Points)                     [ ] Factor V Leiden                                                (3 Points)                        [ ] Lupus anticoagulants                                      (3 Points)                                                           [ ] Anticardiolipin antibodies                              (3 Points)                                                       [ ] High homocysteine in the blood                   (3 Points)                                             [ ] Other congenital or acquired thrombophilia      (3 Points)                                                [ ] Heparin induced thrombocytopenia                  (3 Points)                                        MOBILITY RELATED FACTORS  [ ] Bed rest                                                         (1 Point)  [ ] Plaster cast                                                    (2 points)  [ ] Bed bound for more than 72 hours           (2 Points)    GENDER SPECIFIC FACTORS  [ ] Pregnancy or had a baby within the last month   (1 Point)  [ ] Post-partum < 6 weeks                                   (1 Point)  [ ] Hormonal therapy  or oral contraception   (1 Point)  [ ] History of pregnancy complications              (1 point)  [ ] Unexplained or recurrent              (1 Point)    OTHER RISK FACTORS                                           (1 Point)  [x ] BMI >40, smoking, diabetes requiring insulin, chemotherapy  blood transfusions and length of surgery over 2 hours    SURGERY RELATED RISK FACTORS  [ ]  Section within the last month     (1 Point)  [ ] Minor surgery                                                  (1 Point)  [ ] Arthroscopic surgery                                       (2 Points)  [x ] Planned major surgery lasting more            (2 Points)      than 45 minutes     [ ] Elective hip or knee joint replacement       (5 points)       surgery                                                TRAUMA RELATED RISK FACTORS  [ ] Fracture of the hip, pelvis, or leg                       (5 Points)  [ ] Spinal cord injury resulting in paralysis             (5 points)       (in the previous month)    [ ] Paralysis  (less than 1 month)                             (5 Points)  [ ] Multiple Trauma within 1 month                        (5 Points)    Total Score [   5     ]    Caprini Score 0-2: Low Risk, NO VTE prophylaxis required for most patients, encourage ambulation  Caprini Score 3-6: Moderate Risk , pharmacologic VTE prophylaxis is indicated for most patients (in the absence of contraindications)  Caprini Score Greater than or =7: High risk, pharmocologic VTE prophylaxis indicated for most patients (in the absence of contraindications)

## 2021-10-07 NOTE — H&P PST ADULT - PROBLEM SELECTOR PLAN 4
Covid 19 PCR pending for  anterior cervical disectomy and fusion on 10/25/21 with Dr. Corby Perla secondary to cervical region radiculopathy, cervical disc displacement and spondylosis without myelopathy radiculopathy of cervical region.

## 2021-10-07 NOTE — H&P PST ADULT - ATTENDING COMMENTS
pt presents for 2 level ACDF after failed conservative care and radic. pt aware of incomplete resolution of s/sx adjacent lv. dz. dysphagia.

## 2021-10-07 NOTE — PATIENT PROFILE ADULT - NSPROHMSYMPCOND_GEN_A_NUR
Pre op teaching surgical scrub pain management and covid swab instructions given to pt    Spine surgery booklet given to pt  Pt advised to call drs office with any questions/none Pre op teaching surgical scrub pain management and covid swab instructions given to pt    Spine surgery booklet given to pt  Pt advised to call drs office with any questions      10-29-21 - telephone update - covid test today [address] [place] [address] ./none

## 2021-10-07 NOTE — PATIENT PROFILE ADULT - NSPREOP1_ABLETOREACHPT_GEN_A_NUR
[facility]  [phone] [facility]  [phone] [place]  [phone]/yes

## 2021-10-07 NOTE — H&P PST ADULT - NSANTHOSAYNRD_GEN_A_CORE
No. RYANNE screening performed.  STOP BANG Legend: 0-2 = LOW Risk; 3-4 = INTERMEDIATE Risk; 5-8 = HIGH Risk

## 2021-10-07 NOTE — H&P PST ADULT - NSICDXPASTMEDICALHX_GEN_ALL_CORE_FT
PAST MEDICAL HISTORY:  Other cervical disc displacement, unspecified cervical region     Radiculopathy, cervical     Spondylosis without myelopathy or radiculopathy, cervical region

## 2021-10-07 NOTE — H&P PST ADULT - NEGATIVE MUSCULOSKELETAL SYMPTOMS
no arthralgia/no arthritis/no joint swelling/no myalgia/no muscle cramps/no muscle weakness/no stiffness/no arm pain L/no arm pain R/no back pain/no leg pain L/no leg pain R

## 2021-10-07 NOTE — H&P PST ADULT - PROBLEM SELECTOR PLAN 3
previously intubated - no problems
Covid 19 PCR pending for  anterior cervical disectomy and fusion on 10/25/21 with Dr. Corby Perla secondary to cervical region radiculopathy, cervical disc displacement and spondylosis without myelopathy radiculopathy of cervical region.

## 2021-10-08 PROBLEM — M54.12 RADICULOPATHY, CERVICAL REGION: Chronic | Status: ACTIVE | Noted: 2021-10-07

## 2021-10-08 PROBLEM — M47.812 SPONDYLOSIS WITHOUT MYELOPATHY OR RADICULOPATHY, CERVICAL REGION: Chronic | Status: ACTIVE | Noted: 2021-10-07

## 2021-10-08 PROBLEM — M50.20 OTHER CERVICAL DISC DISPLACEMENT, UNSPECIFIED CERVICAL REGION: Chronic | Status: ACTIVE | Noted: 2021-10-07

## 2021-10-25 DIAGNOSIS — Z01.818 ENCOUNTER FOR OTHER PREPROCEDURAL EXAMINATION: ICD-10-CM

## 2021-10-29 ENCOUNTER — APPOINTMENT (OUTPATIENT)
Dept: DISASTER EMERGENCY | Facility: CLINIC | Age: 41
End: 2021-10-29

## 2021-10-30 LAB — SARS-COV-2 N GENE NPH QL NAA+PROBE: NOT DETECTED

## 2021-10-31 ENCOUNTER — TRANSCRIPTION ENCOUNTER (OUTPATIENT)
Age: 41
End: 2021-10-31

## 2021-11-01 ENCOUNTER — INPATIENT (INPATIENT)
Facility: HOSPITAL | Age: 41
LOS: 1 days | Discharge: HOME | DRG: 473 | End: 2021-11-03
Attending: ORTHOPAEDIC SURGERY | Admitting: ORTHOPAEDIC SURGERY
Payer: COMMERCIAL

## 2021-11-01 ENCOUNTER — APPOINTMENT (OUTPATIENT)
Dept: ORTHOPEDIC SURGERY | Facility: HOSPITAL | Age: 41
End: 2021-11-01

## 2021-11-01 ENCOUNTER — TRANSCRIPTION ENCOUNTER (OUTPATIENT)
Age: 41
End: 2021-11-01

## 2021-11-01 VITALS
DIASTOLIC BLOOD PRESSURE: 66 MMHG | HEIGHT: 62 IN | SYSTOLIC BLOOD PRESSURE: 103 MMHG | HEART RATE: 73 BPM | OXYGEN SATURATION: 100 % | RESPIRATION RATE: 16 BRPM | TEMPERATURE: 98 F | WEIGHT: 138.89 LBS

## 2021-11-01 DIAGNOSIS — M47.812 SPONDYLOSIS WITHOUT MYELOPATHY OR RADICULOPATHY, CERVICAL REGION: ICD-10-CM

## 2021-11-01 DIAGNOSIS — G89.18 OTHER ACUTE POSTPROCEDURAL PAIN: ICD-10-CM

## 2021-11-01 DIAGNOSIS — M47.22 OTHER SPONDYLOSIS WITH RADICULOPATHY, CERVICAL REGION: ICD-10-CM

## 2021-11-01 DIAGNOSIS — M50.20 OTHER CERVICAL DISC DISPLACEMENT, UNSPECIFIED CERVICAL REGION: ICD-10-CM

## 2021-11-01 DIAGNOSIS — Z98.890 OTHER SPECIFIED POSTPROCEDURAL STATES: Chronic | ICD-10-CM

## 2021-11-01 LAB — BLD GP AB SCN SERPL QL: SIGNIFICANT CHANGE UP

## 2021-11-01 PROCEDURE — 22551 ARTHRD ANT NTRBDY CERVICAL: CPT | Mod: AS

## 2021-11-01 PROCEDURE — 22845 INSERT SPINE FIXATION DEVICE: CPT | Mod: AS,59

## 2021-11-01 PROCEDURE — 22853 INSJ BIOMECHANICAL DEVICE: CPT

## 2021-11-01 PROCEDURE — 22552 ARTHRD ANT NTRBD CERVICAL EA: CPT | Mod: AS

## 2021-11-01 PROCEDURE — 22853 INSJ BIOMECHANICAL DEVICE: CPT | Mod: AS

## 2021-11-01 PROCEDURE — 22552 ARTHRD ANT NTRBD CERVICAL EA: CPT

## 2021-11-01 PROCEDURE — 22551 ARTHRD ANT NTRBDY CERVICAL: CPT

## 2021-11-01 PROCEDURE — 99222 1ST HOSP IP/OBS MODERATE 55: CPT

## 2021-11-01 PROCEDURE — 22845 INSERT SPINE FIXATION DEVICE: CPT | Mod: 59

## 2021-11-01 RX ORDER — PANTOPRAZOLE SODIUM 20 MG/1
40 TABLET, DELAYED RELEASE ORAL
Refills: 0 | Status: DISCONTINUED | OUTPATIENT
Start: 2021-11-01 | End: 2021-11-03

## 2021-11-01 RX ORDER — DIAZEPAM 5 MG
2 TABLET ORAL EVERY 8 HOURS
Refills: 0 | Status: DISCONTINUED | OUTPATIENT
Start: 2021-11-01 | End: 2021-11-03

## 2021-11-01 RX ORDER — CEFAZOLIN SODIUM 1 G
2000 VIAL (EA) INJECTION
Refills: 0 | Status: COMPLETED | OUTPATIENT
Start: 2021-11-01 | End: 2021-11-02

## 2021-11-01 RX ORDER — METHOCARBAMOL 500 MG/1
750 TABLET, FILM COATED ORAL THREE TIMES A DAY
Refills: 0 | Status: DISCONTINUED | OUTPATIENT
Start: 2021-11-01 | End: 2021-11-03

## 2021-11-01 RX ORDER — ONDANSETRON 8 MG/1
4 TABLET, FILM COATED ORAL ONCE
Refills: 0 | Status: DISCONTINUED | OUTPATIENT
Start: 2021-11-01 | End: 2021-11-01

## 2021-11-01 RX ORDER — OXYCODONE HYDROCHLORIDE 5 MG/1
10 TABLET ORAL
Refills: 0 | Status: DISCONTINUED | OUTPATIENT
Start: 2021-11-01 | End: 2021-11-03

## 2021-11-01 RX ORDER — CEFAZOLIN SODIUM 1 G
2000 VIAL (EA) INJECTION ONCE
Refills: 0 | Status: DISCONTINUED | OUTPATIENT
Start: 2021-11-01 | End: 2021-11-01

## 2021-11-01 RX ORDER — ASPIRIN/CALCIUM CARB/MAGNESIUM 324 MG
81 TABLET ORAL DAILY
Refills: 0 | Status: DISCONTINUED | OUTPATIENT
Start: 2021-11-02 | End: 2021-11-03

## 2021-11-01 RX ORDER — CELECOXIB 200 MG/1
200 CAPSULE ORAL ONCE
Refills: 0 | Status: COMPLETED | OUTPATIENT
Start: 2021-11-01 | End: 2021-11-01

## 2021-11-01 RX ORDER — ACETAMINOPHEN 500 MG
975 TABLET ORAL ONCE
Refills: 0 | Status: COMPLETED | OUTPATIENT
Start: 2021-11-01 | End: 2021-11-01

## 2021-11-01 RX ORDER — ONDANSETRON 8 MG/1
4 TABLET, FILM COATED ORAL EVERY 6 HOURS
Refills: 0 | Status: DISCONTINUED | OUTPATIENT
Start: 2021-11-01 | End: 2021-11-03

## 2021-11-01 RX ORDER — APREPITANT 80 MG/1
40 CAPSULE ORAL ONCE
Refills: 0 | Status: COMPLETED | OUTPATIENT
Start: 2021-11-01 | End: 2021-11-01

## 2021-11-01 RX ORDER — SODIUM CHLORIDE 9 MG/ML
1000 INJECTION, SOLUTION INTRAVENOUS
Refills: 0 | Status: DISCONTINUED | OUTPATIENT
Start: 2021-11-01 | End: 2021-11-01

## 2021-11-01 RX ORDER — ACETAMINOPHEN 500 MG
975 TABLET ORAL EVERY 6 HOURS
Refills: 0 | Status: DISCONTINUED | OUTPATIENT
Start: 2021-11-01 | End: 2021-11-03

## 2021-11-01 RX ORDER — SENNA PLUS 8.6 MG/1
2 TABLET ORAL AT BEDTIME
Refills: 0 | Status: DISCONTINUED | OUTPATIENT
Start: 2021-11-01 | End: 2021-11-03

## 2021-11-01 RX ORDER — BENZOCAINE AND MENTHOL 5; 1 G/100ML; G/100ML
1 LIQUID ORAL
Refills: 0 | Status: DISCONTINUED | OUTPATIENT
Start: 2021-11-01 | End: 2021-11-03

## 2021-11-01 RX ORDER — MAGNESIUM HYDROXIDE 400 MG/1
30 TABLET, CHEWABLE ORAL EVERY 12 HOURS
Refills: 0 | Status: DISCONTINUED | OUTPATIENT
Start: 2021-11-01 | End: 2021-11-03

## 2021-11-01 RX ORDER — OXYCODONE HYDROCHLORIDE 5 MG/1
5 TABLET ORAL
Refills: 0 | Status: DISCONTINUED | OUTPATIENT
Start: 2021-11-01 | End: 2021-11-03

## 2021-11-01 RX ORDER — FENTANYL CITRATE 50 UG/ML
50 INJECTION INTRAVENOUS
Refills: 0 | Status: DISCONTINUED | OUTPATIENT
Start: 2021-11-01 | End: 2021-11-01

## 2021-11-01 RX ADMIN — Medication 975 MILLIGRAM(S): at 12:24

## 2021-11-01 RX ADMIN — FENTANYL CITRATE 50 MICROGRAM(S): 50 INJECTION INTRAVENOUS at 17:15

## 2021-11-01 RX ADMIN — Medication 100 MILLIGRAM(S): at 21:13

## 2021-11-01 RX ADMIN — ONDANSETRON 4 MILLIGRAM(S): 8 TABLET, FILM COATED ORAL at 21:57

## 2021-11-01 RX ADMIN — Medication 975 MILLIGRAM(S): at 18:34

## 2021-11-01 RX ADMIN — FENTANYL CITRATE 50 MICROGRAM(S): 50 INJECTION INTRAVENOUS at 16:40

## 2021-11-01 RX ADMIN — METHOCARBAMOL 750 MILLIGRAM(S): 500 TABLET, FILM COATED ORAL at 21:14

## 2021-11-01 RX ADMIN — SODIUM CHLORIDE 3 MILLILITER(S): 9 INJECTION INTRAMUSCULAR; INTRAVENOUS; SUBCUTANEOUS at 21:19

## 2021-11-01 RX ADMIN — FENTANYL CITRATE 50 MICROGRAM(S): 50 INJECTION INTRAVENOUS at 16:45

## 2021-11-01 RX ADMIN — SENNA PLUS 2 TABLET(S): 8.6 TABLET ORAL at 21:14

## 2021-11-01 RX ADMIN — Medication 2 MILLIGRAM(S): at 17:32

## 2021-11-01 RX ADMIN — CELECOXIB 200 MILLIGRAM(S): 200 CAPSULE ORAL at 12:23

## 2021-11-01 RX ADMIN — APREPITANT 40 MILLIGRAM(S): 80 CAPSULE ORAL at 12:24

## 2021-11-01 RX ADMIN — OXYCODONE HYDROCHLORIDE 10 MILLIGRAM(S): 5 TABLET ORAL at 18:34

## 2021-11-01 RX ADMIN — Medication 975 MILLIGRAM(S): at 23:21

## 2021-11-01 RX ADMIN — FENTANYL CITRATE 50 MICROGRAM(S): 50 INJECTION INTRAVENOUS at 16:44

## 2021-11-01 NOTE — BRIEF OPERATIVE NOTE - NSICDXBRIEFPREOP_GEN_ALL_CORE_FT
PRE-OP DIAGNOSIS:  Cervical spondylosis with radiculopathy 01-Nov-2021 12:36:50  Corby Perla  
PRE-OP DIAGNOSIS:  Cervical spondylosis with radiculopathy 01-Nov-2021 12:36:50  Corby Perla

## 2021-11-01 NOTE — CONSULT NOTE ADULT - PROBLEM SELECTOR RECOMMENDATION 3
DVT prophylaxis  - as per ortho protocol  Opioid induced constipation  prophylaxis - bowel regimen     Thank you for the courtesy of this consult ,   will follow

## 2021-11-01 NOTE — BRIEF OPERATIVE NOTE - OPERATION/FINDINGS
I assisted all aspects of operative positioning including placing shoulder bolster, taping of shoulders in a slightly dependent position, maintenance of head and an extended position. I obtained fluoroscopic imaging confirming the appropriate visualization of levels.  I participated in operative time out.   I cleansed the operative area with Betadine and RN then prepped with DuraPrep. I participated in draping with blue drapes and ioban and then ensured that drapes were appropriately positioned.I assisted with surgical exposure to the pretracheal prevertebral fascia using toothless pickups and then Cloward retractors during which time I assisted with maintenance of hemostasis with Surgi-Corwin, Ray-Vijaya, persistent suction, intermittent irrigation. I assisted with sequential placement of [place] pins at level and level. Annulotomies were performed at level and level using a Bovie cautery as well as a 15 blade. I assisted with discectomy procedure at C4-C5, C5-C6 by using pituitary, micropituitary and suction. I assisted with osteophytectomy by using irrigation and suction.  I assisted with distraction of the disc space using K2 M/Iola retractor. After placement of intervertebral grafts with a combination of allograft (DBM/Asya) and autograft (from bur shavings), I assisted with plate placement over the anterior cervical spine by using Cloward retraction, intermittent irrigation, consistent suction.  I verified that screws were placed, tightened and torqued per Tameka protocol. Copious irrigation was then used, final hemostasis was performed with FloSeal, Ray-Vijaya, and bipolar cautery. Fluoroscopic imaging confirmed appropriate placement of implants. I confirmed with operating surgeon and neuro monitoring that final neuro monitoring was stable.  Closure was performed platysma with 2-0 vicryl, superficial fascia with 3-0 vicryl and skin with 4-0 MONOCRYL.

## 2021-11-01 NOTE — PROGRESS NOTE ADULT - SUBJECTIVE AND OBJECTIVE BOX
WILIAM GOMEZADELAIDARIOSBALJEETERIBERTO  [id number]  41yFemale    STATUS POST:  ACDF C4-C5,C5-C6 for cervical stenosis, cervical HNP with left    upper extremity radiculitis.    Other herniation of intervertebral disc of cervical spine    No pertinent family history in first degree relatives    Handoff    Radiculopathy, cervical    Other cervical disc displacement, unspecified cervical region    Spondylosis without myelopathy or radiculopathy, cervical region    Cervical spondylosis with radiculopathy    Cervical spondylosis with radiculopathy    Need for prophylactic measure    Cervical radiculopathy    Cervical disc displacement    Spondylosis of cervical spine without myelopathy    Anterior cervical discectomy with fusion    H/O breast reconstruction    OTHER CERVICAL DISC DISPLACEME        SUBJECTIVE: Patient seen and examined doing well  Pain controlled, positive posterior neck pain as expected     OBJECTIVE:   T(C): 36.9 (11-01-21 @ 16:17), Max: 36.9 (11-01-21 @ 16:17)  HR: 73 (11-01-21 @ 11:45) (73 - 73)  BP: 103/63 (11-01-21 @ 11:45) (103/63 - 103/66)  RR: 16 (11-01-21 @ 11:45) (16 - 16)  SpO2: 100% (11-01-21 @ 11:45) (100% - 100%)  Constitutional: Pleasant in no acute distress  Psych:A&Ox3  EENT: no dysphonia, no dyspnea.  mild dysphagia as expected  Abdominal: soft and supple non distended  Lymphatics: no pretibial pitting edema  Spine:          Dressing:  clean/dry/intact, no drain               Sensation:          Upper extremity          grossly intact manually,     distribution paresthesia on the left   much improved          Lower extremity           grossly intact manually                               Motor:                   Lower and upper extremity grossly intact manually, positive posterior cervicalgia as expected            Vascular:[x] warm well perfused; capillary refill <3 seconds                A/P :41y FemaleS/P ACDF as above  POD#0  -    Pain control- multimodal approach. Avoid NSAIDS x 6 weeks post op may deter fusion.  Focus on muscle relaxers.   -    DVT ppx: [ x]SCDs, early ambulation,  Pharmacolgic ASA 81 mg once daily to complete 28 days post op.   -    Periop abx:  Ancef [x ]      -    Likely 23 hour admission  -   change dressing in office next week and prn   -    Resume home meds as appropriate  -PT WBAT, balance and gait  -brace cervical collar with OOB is for comfort and not mandatory, never to be worn in bed, with eating or hygeine but should be worn when in a motor vehicle x 28 days post op.   -medical follow up Dr Mason hospitalist  - patient should be reassured that it is normal to have dysphagia post operative, encourage soft diet, cutting food in small pieces.  cepacol losenges ordered

## 2021-11-01 NOTE — CONSULT NOTE ADULT - SUBJECTIVE AND OBJECTIVE BOX
Patient is a 41y old  Female who presents is s/p elective ACDF, POD#0,   seen and examined on PACU , c/o acute postoperative neck pain .     CC: chronic neck pain with radiculopathy , postop with acute postoperative pain       HPI:  42 y/o female  without significant PMH Pt. states she has had pain in her neck since 2019, she states she slipped in a supermarket and injured herself. She states she has had PT, and has tried other medications and injections since her injury which have provided her no relief,  pain  in her neck, radiating  to her shoulder. Pain is relieved by advil, pain gives her HAs, and pain is worsened by being at work as the work is heavy and worse with the physical demands of her job. Admits to numbness/tingling when she wakes up of both arms intermittently, and sometimes just her right arm.       PAST MEDICAL & SURGICAL HISTORY:  Radiculopathy, cervical    Other cervical disc displacement, unspecified cervical region    Spondylosis without myelopathy or radiculopathy, cervical region    H/O breast reconstruction        Social History:  Tobacco - denies  ETOH - denies   Illicit drug abuse - denies    FAMILY HISTORY:  No pertinent family history in first degree relatives        Allergies    No Known Allergies    Intolerances        HOME MEDICATIONS :     REVIEW OF SYSTEMS:    As above, all other systems are reviewed and are negative .    MEDICATIONS  (STANDING):  acetaminophen     Tablet .. 975 milliGRAM(s) Oral every 6 hours  ceFAZolin   IVPB 2000 milliGRAM(s) IV Intermittent <User Schedule>  influenza   Vaccine 0.5 milliLiter(s) IntraMuscular once  methocarbamol 750 milliGRAM(s) Oral three times a day  pantoprazole    Tablet 40 milliGRAM(s) Oral before breakfast  senna 2 Tablet(s) Oral at bedtime  sodium chloride 0.9% lock flush 3 milliLiter(s) IV Push every 8 hours    MEDICATIONS  (PRN):  aluminum hydroxide/magnesium hydroxide/simethicone Suspension 30 milliLiter(s) Oral every 12 hours PRN Indigestion  benzocaine 15 mG/menthol 3.6 mG (Sugar-Free) Lozenge 1 Lozenge Oral every 2 hours PRN Sore Throat  diazepam    Tablet 2 milliGRAM(s) Oral every 8 hours PRN Anxiety OR SPASM REFRACTORY TO CURRENT MEDS  magnesium hydroxide Suspension 30 milliLiter(s) Oral every 12 hours PRN Constipation  ondansetron Injectable 4 milliGRAM(s) IV Push every 6 hours PRN Nausea  oxyCODONE    IR 5 milliGRAM(s) Oral every 3 hours PRN Moderate Pain (4 - 6)  oxyCODONE    IR 10 milliGRAM(s) Oral every 3 hours PRN Severe Pain (7 - 10)      Vital Signs Last 24 Hrs  T(C): 36.8 (01 Nov 2021 18:44), Max: 36.9 (01 Nov 2021 16:17)  T(F): 98.3 (01 Nov 2021 18:44), Max: 98.4 (01 Nov 2021 16:17)  HR: 83 (01 Nov 2021 18:44) (71 - 104)  BP: 115/76 (01 Nov 2021 18:44) (103/63 - 119/65)  BP(mean): --  RR: 18 (01 Nov 2021 18:44) (13 - 18)  SpO2: 100% (01 Nov 2021 18:44) (97% - 100%)    PHYSICAL EXAM:    GENERAL: NAD, well-groomed, well-developed  HEAD:  Atraumatic, Normocephalic  EYES: EOMI, PERRLA, conjunctiva and sclera clear  NECK: Supple, No JVD, Normal thyroid, dry and clean dressing +   NERVOUS SYSTEM:  Alert & Oriented X4, no focal deficit  CHEST/LUNG: CTA  b/l,  no rales, rhonchi, wheezing, or rubs  HEART: Regular rate and rhythm; No murmurs, rubs, or gallops  ABDOMEN: Soft, Nontender, Nondistended; Bowel sounds present  EXTREMITIES:  2+ Peripheral Pulses, No clubbing, cyanosis, or edema ,   LYMPH: No lymphadenopathy noted  SKIN: No rashes or lesions    LABS: Pending       RADIOLOGY & ADDITIONAL STUDIES:

## 2021-11-01 NOTE — CONSULT NOTE ADULT - PROBLEM SELECTOR RECOMMENDATION 9
s/p ACDF,   PT/OT/pain mgmt  DVT prophylaxis- as per ortho  Abx as per SCIP  Incentive spirometry  Prophylaxis of opioid  induced constipation.

## 2021-11-01 NOTE — BRIEF OPERATIVE NOTE - NSICDXBRIEFPOSTOP_GEN_ALL_CORE_FT
POST-OP DIAGNOSIS:  Cervical spondylosis with radiculopathy 01-Nov-2021 12:37:05  Corby Perla  
POST-OP DIAGNOSIS:  Cervical spondylosis with radiculopathy 01-Nov-2021 12:37:05  Corby Perla

## 2021-11-01 NOTE — CONSULT NOTE ADULT - ASSESSMENT
40 y/o female  without significant PMH Pt. states she has had pain in her neck since 2019, she states she slipped in a supermarket and injured herself. She states she has had PT, and has tried other medications and injections since her injury which have provided her no relief,  pain  in her neck, radiating  to her shoulder. Pain is relieved by advil, pain gives her HAs, and pain is worsened by being at work as the work is heavy and worse with the physical demands of her job. Admits to numbness/tingling when she wakes up of both arms intermittently, and sometimes just her right arm.

## 2021-11-01 NOTE — PRE-OP CHECKLIST -  HIBICLENS SHOWER 2 DATE
Megan 31-Oct-2021

## 2021-11-02 LAB
ANION GAP SERPL CALC-SCNC: 12 MMOL/L — SIGNIFICANT CHANGE UP (ref 5–17)
BUN SERPL-MCNC: 11.9 MG/DL — SIGNIFICANT CHANGE UP (ref 8–20)
CALCIUM SERPL-MCNC: 8.5 MG/DL — LOW (ref 8.6–10.2)
CHLORIDE SERPL-SCNC: 110 MMOL/L — HIGH (ref 98–107)
CO2 SERPL-SCNC: 23 MMOL/L — SIGNIFICANT CHANGE UP (ref 22–29)
COVID-19 SPIKE DOMAIN AB INTERP: POSITIVE
COVID-19 SPIKE DOMAIN ANTIBODY RESULT: >250 U/ML — HIGH
CREAT SERPL-MCNC: 0.67 MG/DL — SIGNIFICANT CHANGE UP (ref 0.5–1.3)
GLUCOSE SERPL-MCNC: 135 MG/DL — HIGH (ref 70–99)
HCT VFR BLD CALC: 32.6 % — LOW (ref 34.5–45)
HGB BLD-MCNC: 11 G/DL — LOW (ref 11.5–15.5)
MCHC RBC-ENTMCNC: 32.4 PG — SIGNIFICANT CHANGE UP (ref 27–34)
MCHC RBC-ENTMCNC: 33.7 GM/DL — SIGNIFICANT CHANGE UP (ref 32–36)
MCV RBC AUTO: 95.9 FL — SIGNIFICANT CHANGE UP (ref 80–100)
PLATELET # BLD AUTO: 249 K/UL — SIGNIFICANT CHANGE UP (ref 150–400)
POTASSIUM SERPL-MCNC: 3.7 MMOL/L — SIGNIFICANT CHANGE UP (ref 3.5–5.3)
POTASSIUM SERPL-SCNC: 3.7 MMOL/L — SIGNIFICANT CHANGE UP (ref 3.5–5.3)
RBC # BLD: 3.4 M/UL — LOW (ref 3.8–5.2)
RBC # FLD: 12.8 % — SIGNIFICANT CHANGE UP (ref 10.3–14.5)
SARS-COV-2 IGG+IGM SERPL QL IA: >250 U/ML — HIGH
SARS-COV-2 IGG+IGM SERPL QL IA: POSITIVE
SODIUM SERPL-SCNC: 145 MMOL/L — SIGNIFICANT CHANGE UP (ref 135–145)
WBC # BLD: 11.97 K/UL — HIGH (ref 3.8–10.5)
WBC # FLD AUTO: 11.97 K/UL — HIGH (ref 3.8–10.5)

## 2021-11-02 PROCEDURE — 99232 SBSQ HOSP IP/OBS MODERATE 35: CPT

## 2021-11-02 RX ORDER — SODIUM CHLORIDE 9 MG/ML
1000 INJECTION INTRAMUSCULAR; INTRAVENOUS; SUBCUTANEOUS
Refills: 0 | Status: DISCONTINUED | OUTPATIENT
Start: 2021-11-02 | End: 2021-11-03

## 2021-11-02 RX ORDER — ACETAMINOPHEN 500 MG
650 TABLET ORAL ONCE
Refills: 0 | Status: COMPLETED | OUTPATIENT
Start: 2021-11-02 | End: 2021-11-02

## 2021-11-02 RX ADMIN — OXYCODONE HYDROCHLORIDE 10 MILLIGRAM(S): 5 TABLET ORAL at 04:12

## 2021-11-02 RX ADMIN — Medication 975 MILLIGRAM(S): at 05:21

## 2021-11-02 RX ADMIN — SODIUM CHLORIDE 3 MILLILITER(S): 9 INJECTION INTRAMUSCULAR; INTRAVENOUS; SUBCUTANEOUS at 05:25

## 2021-11-02 RX ADMIN — METHOCARBAMOL 750 MILLIGRAM(S): 500 TABLET, FILM COATED ORAL at 21:51

## 2021-11-02 RX ADMIN — PANTOPRAZOLE SODIUM 40 MILLIGRAM(S): 20 TABLET, DELAYED RELEASE ORAL at 05:20

## 2021-11-02 RX ADMIN — OXYCODONE HYDROCHLORIDE 10 MILLIGRAM(S): 5 TABLET ORAL at 01:10

## 2021-11-02 RX ADMIN — SODIUM CHLORIDE 3 MILLILITER(S): 9 INJECTION INTRAMUSCULAR; INTRAVENOUS; SUBCUTANEOUS at 15:08

## 2021-11-02 RX ADMIN — Medication 975 MILLIGRAM(S): at 20:27

## 2021-11-02 RX ADMIN — METHOCARBAMOL 750 MILLIGRAM(S): 500 TABLET, FILM COATED ORAL at 05:21

## 2021-11-02 RX ADMIN — SODIUM CHLORIDE 3 MILLILITER(S): 9 INJECTION INTRAMUSCULAR; INTRAVENOUS; SUBCUTANEOUS at 23:36

## 2021-11-02 RX ADMIN — Medication 81 MILLIGRAM(S): at 11:52

## 2021-11-02 RX ADMIN — Medication 650 MILLIGRAM(S): at 16:39

## 2021-11-02 RX ADMIN — SODIUM CHLORIDE 125 MILLILITER(S): 9 INJECTION INTRAMUSCULAR; INTRAVENOUS; SUBCUTANEOUS at 08:50

## 2021-11-02 RX ADMIN — Medication 975 MILLIGRAM(S): at 00:03

## 2021-11-02 RX ADMIN — SODIUM CHLORIDE 125 MILLILITER(S): 9 INJECTION INTRAMUSCULAR; INTRAVENOUS; SUBCUTANEOUS at 21:52

## 2021-11-02 RX ADMIN — ONDANSETRON 4 MILLIGRAM(S): 8 TABLET, FILM COATED ORAL at 08:39

## 2021-11-02 RX ADMIN — Medication 100 MILLIGRAM(S): at 05:17

## 2021-11-02 RX ADMIN — SODIUM CHLORIDE 125 MILLILITER(S): 9 INJECTION INTRAMUSCULAR; INTRAVENOUS; SUBCUTANEOUS at 20:27

## 2021-11-02 RX ADMIN — Medication 975 MILLIGRAM(S): at 12:08

## 2021-11-02 RX ADMIN — BENZOCAINE AND MENTHOL 1 LOZENGE: 5; 1 LIQUID ORAL at 05:10

## 2021-11-02 RX ADMIN — OXYCODONE HYDROCHLORIDE 10 MILLIGRAM(S): 5 TABLET ORAL at 04:59

## 2021-11-02 RX ADMIN — Medication 975 MILLIGRAM(S): at 21:01

## 2021-11-02 RX ADMIN — Medication 650 MILLIGRAM(S): at 17:38

## 2021-11-02 RX ADMIN — Medication 975 MILLIGRAM(S): at 06:11

## 2021-11-02 RX ADMIN — SENNA PLUS 2 TABLET(S): 8.6 TABLET ORAL at 21:51

## 2021-11-02 RX ADMIN — METHOCARBAMOL 750 MILLIGRAM(S): 500 TABLET, FILM COATED ORAL at 15:39

## 2021-11-02 RX ADMIN — Medication 975 MILLIGRAM(S): at 11:52

## 2021-11-02 RX ADMIN — Medication 2 MILLIGRAM(S): at 05:10

## 2021-11-02 RX ADMIN — OXYCODONE HYDROCHLORIDE 10 MILLIGRAM(S): 5 TABLET ORAL at 00:06

## 2021-11-02 NOTE — PROGRESS NOTE ADULT - ASSESSMENT
40 y/o female  without significant PMH Pt. states she has had pain in her neck since 2019, she states she slipped in a supermarket and injured herself. She states she has had PT, and has tried other medications and injections since her injury which have provided her no relief,  pain  in her neck, radiating  to her shoulder. Pain is relieved by advil, pain gives her HAs, and pain is worsened by being at work as the work is heavy and worse with the physical demands of her job. Admits to numbness/tingling when she wakes up of both arms intermittently, and sometimes just her right arm.      Problem/Recommendation - 1:  ·  Problem: Spondylosis of cervical spine without myelopathy.   ·  Recommendation: s/p ACDF,   PT/OT/pain mgmt  DVT prophylaxis- as per ortho  Abx as per SCIP- given   Incentive spirometry  Prophylaxis of opioid  induced constipation.       Problem/Recommendation - 2:  ·  Problem: Acute postoperative pain, pain still not well controlled ,   c/o headache - observe   ·  Recommendation: - pain mgmt.     Problem/Recommendation - 3:  ·  Problem: Need for prophylactic measure.   ·  Recommendation: DVT prophylaxis  - as per ortho protocol  Opioid induced constipation  prophylaxis - bowel regimen     Problem / Plan - BP on lower side - will give ivf .   Labs ordered - will follow up .   Dispo plan is Home - likely today .    Medically stable to d/c once cleared by physical therapy / ortho   and if pain better controlled and labs are acceptable .   D/P patient / nurse / ortho PA .

## 2021-11-02 NOTE — DISCHARGE NOTE PROVIDER - HOSPITAL COURSE
patient was admitted s/p ACDF C4-C5,C5-C6 for cervical stenosis, cervical HNP with left upper extremity radiculitis on 11/1/21. The post operative course was uneventful.  PT/OT Worked with patient for acute rehabilitation process. The pain was adequately controlled and patient is able to go home as she can accomplish ADL with help from family member at this time.  The cervical collar was worn for comfort when out of bed. patient was admitted s/p ACDF C4-C5,C5-C6 for cervical stenosis, cervical HNP with left upper extremity radiculitis on 11/1/21. The post operative course was uneventful.  PT/OT Worked with patient for acute rehabilitation process. The pain was adequately controlled and patient is able to go home as she can accomplish ADL with help from family member at this time. The cervical collar was worn for comfort when out of bed.

## 2021-11-02 NOTE — DISCHARGE NOTE PROVIDER - NSDCMRMEDTOKEN_GEN_ALL_CORE_FT
Advil 200 mg oral tablet: 3 tab(s) orally 3 times a day   acetaminophen 325 mg oral capsule: 2 cap(s) orally every 6 hours  for mild to moderate pain. Do not exceed 4000 mg in 24 hours.  Aspirin Enteric Coated 81 mg oral delayed release tablet: 1 tab(s) orally once a day. Take with food.    methocarbamol 750 mg oral tablet: 1 tab(s) orally 3 times a day, As Needed for muscle spasms.   ondansetron 4 mg oral disintegrating strip: 1 each orally every 8 hours, As Needed for nausea/vomiting.   oxyCODONE 5 mg oral tablet: 1 tab(s) orally every 4 to 6 hours, As Needed for severe pain. MDD:6  pantoprazole 40 mg oral delayed release tablet: 1 tab(s) orally once a day (before a meal)  Senna S 50 mg-8.6 mg oral tablet: 2 tab(s) orally once a day (at bedtime) .

## 2021-11-02 NOTE — DISCHARGE NOTE PROVIDER - NSDCFUADDINST_GEN_ALL_CORE_FT
Leave occlusive dressing on wound for one week. You may then remove it and leave open to air. Protect while showering.  Leave steri strips intact, they will fall off on their own or be removed on first post op visit. Wear cervical collar when out of bed for comfort, especially when you are passenger in a car , may take off for meals & showering.  Physical therapy will be prescribed on second office visit. The patient will take ASPIRIN 81mg once daily x 28 days for DVT prophylaxis.  For now, engage in light activity as tolerated, no lifting greater than 5 lb.  No driving while on pain meds and must wear cervical collar when in a vehicle for 28 days.  Use the Valium for neck spasms as needed. Avoid NSAIDS x 6 weeks post op may deter fusion.

## 2021-11-02 NOTE — DISCHARGE NOTE PROVIDER - CARE PROVIDER_API CALL
Corby Perla (DO)  Orthopaedic Surgery  [address] [place] [address], [address]  [place] [address]  Phone: [phone]  Fax: [phone]  Follow Up Time:

## 2021-11-02 NOTE — PROGRESS NOTE ADULT - SUBJECTIVE AND OBJECTIVE BOX
Patient seen and examined . S/p ACDF   , POD # 1. c/o posterior / anterior neck pain , c/o headache 5/10 , c/o   sore throat , no n/v , voiding without difficulty , able to swallow food .     CC : Neck pain with radiculopathy - post op acute pain , headache and sore throat         MEDICATIONS  (STANDING):  acetaminophen     Tablet .. 975 milliGRAM(s) Oral every 6 hours  aspirin  chewable 81 milliGRAM(s) Oral daily  influenza   Vaccine 0.5 milliLiter(s) IntraMuscular once  methocarbamol 750 milliGRAM(s) Oral three times a day  pantoprazole    Tablet 40 milliGRAM(s) Oral before breakfast  senna 2 Tablet(s) Oral at bedtime  sodium chloride 0.9% lock flush 3 milliLiter(s) IV Push every 8 hours  sodium chloride 0.9%. 1000 milliLiter(s) (125 mL/Hr) IV Continuous <Continuous>    MEDICATIONS  (PRN):  aluminum hydroxide/magnesium hydroxide/simethicone Suspension 30 milliLiter(s) Oral every 12 hours PRN Indigestion  benzocaine 15 mG/menthol 3.6 mG (Sugar-Free) Lozenge 1 Lozenge Oral every 2 hours PRN Sore Throat  diazepam    Tablet 2 milliGRAM(s) Oral every 8 hours PRN Anxiety OR SPASM REFRACTORY TO CURRENT MEDS  magnesium hydroxide Suspension 30 milliLiter(s) Oral every 12 hours PRN Constipation  ondansetron Injectable 4 milliGRAM(s) IV Push every 6 hours PRN Nausea  oxyCODONE    IR 5 milliGRAM(s) Oral every 3 hours PRN Moderate Pain (4 - 6)  oxyCODONE    IR 10 milliGRAM(s) Oral every 3 hours PRN Severe Pain (7 - 10)      LABS: Ordered- pending       RADIOLOGY & ADDITIONAL TESTS:      REVIEW OF SYSTEMS:    As above , all other systems are reviewed and are negative .    Vital Signs Last 24 Hrs  T(C): 36.6 (02 Nov 2021 08:00), Max: 36.9 (01 Nov 2021 16:17)  T(F): 97.8 (02 Nov 2021 08:00), Max: 98.4 (01 Nov 2021 16:17)  HR: 62 (02 Nov 2021 08:00) (62 - 104)  BP: 93/57 (02 Nov 2021 08:00) (93/57 - 119/65)  BP(mean): --  RR: 17 (02 Nov 2021 04:41) (13 - 18)  SpO2: 96% (02 Nov 2021 08:00) (94% - 100%)  PHYSICAL EXAM:    GENERAL: NAD, well-groomed, well-developed  HEAD:  Atraumatic, Normocephalic  EYES: EOMI, PERRLA, conjunctiva and sclera clear  NECK: Supple, No JVD, Normal thyroid, anterior dressing + , clean and dry ,   C- collar +   NERVOUS SYSTEM:  Alert & Oriented X3, no focal deficit  CHEST/LUNG: CTA b/l ,  no  rales, rhonchi, wheezing, or rubs  HEART: Regular rate and rhythm; No murmurs, rubs, or gallops  ABDOMEN: Soft, Nontender, Nondistended; Bowel sounds present  EXTREMITIES:  2+ Peripheral Pulses, No clubbing, cyanosis, or edema  LYMPH: No lymphadenopathy noted  SKIN: No rashes or lesions

## 2021-11-02 NOTE — PHYSICAL THERAPY INITIAL EVALUATION ADULT - PERTINENT HX OF CURRENT PROBLEM, REHAB EVAL
Pt presents to [facility] with reports of cervical pain, head aches and tingling to [place] [facility]

## 2021-11-02 NOTE — DISCHARGE NOTE PROVIDER - REASON FOR ADMISSION
s/p ACDF C4-C5,C5-C6 for cervical stenosis, cervical HNP with left    upper extremity radiculitis.     s/p ACDF C4-C5,C5-C6 for cervical stenosis, cervical HNP with left upper extremity radiculitis.

## 2021-11-02 NOTE — DISCHARGE NOTE PROVIDER - NSDCCPCAREPLAN_GEN_ALL_CORE_FT
PRINCIPAL DISCHARGE DIAGNOSIS  Diagnosis: Spondylosis of cervical spine without myelopathy  Assessment and Plan of Treatment:

## 2021-11-02 NOTE — PHYSICAL THERAPY INITIAL EVALUATION ADULT - ADDITIONAL COMMENTS
per patient she is independent prior, does not use or own an AD and is without steps to enter the home. Pt will have the help of her  and daughter when she returns home.

## 2021-11-02 NOTE — PROGRESS NOTE ADULT - SUBJECTIVE AND OBJECTIVE BOX
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    No pertinent family history in first degree relatives    Handoff    Radiculopathy, cervical    Other cervical disc displacement, unspecified cervical region    Spondylosis without myelopathy or radiculopathy, cervical region    Cervical spondylosis with radiculopathy    Cervical spondylosis with radiculopathy    Anterior cervical discectomy with fusion    Spondylosis of cervical spine without myelopathy    Need for prophylactic measure    Cervical radiculopathy    Cervical disc displacement    Spondylosis of cervical spine without myelopathy    Cervical spondylosis    Acute postoperative pain    Spondylosis of cervical spine with radiculopathy    Anterior cervical discectomy with fusion    H/O breast reconstruction    OTHER CERVICAL DISC DISPLACEME    Spondylosis of cervical spine without myelopathy    SysAdmin_VstLnk      POST OPERATIVE DAY #: 1  STATUS POST: Anterior cervical discectomy and fusion C4-C5                      SUBJECTIVE: Patient seen and examined at the bedside. Patient reports pain is controlled with perscribed medication. Patient is participating with PT. Patient denies acute motor or sensory changes. Patient admits to episode of Nausea, and one episode of vomiting last night. Patient otherwise, Denies CP, SOB, dizziness, HA.     OBJECTIVE:   Vital Signs Last 24 Hrs  T(C): 36.7 (02 Nov 2021 04:41), Max: 36.9 (01 Nov 2021 16:17)  T(F): 98.1 (02 Nov 2021 04:41), Max: 98.4 (01 Nov 2021 16:17)  HR: 66 (02 Nov 2021 04:41) (66 - 104)  BP: 94/66 (02 Nov 2021 04:41) (94/66 - 119/65)  BP(mean): --  RR: 17 (02 Nov 2021 04:41) (13 - 18)  SpO2: 94% (02 Nov 2021 04:41) (94% - 100%)\par     Constitutional: Alert, awake, patient has C collar           Sensation:          Upper extremity                ax        mc           m          u          r                                                         R          +           +             +           +          +                                               L           +           +             +           +          +         Lower extremity             sp         dp         saph       ravi         tibial                                                R          +           +             +           +          +                                               L           +           +             +           +          +                     Motor exam:          Upper extremity              Bi(c5)  WE(c6)  EE(c7)   FF(c8)                                                R         5/5        5/5        5/5       5/5                                               L          5/5        5/5        5/5       5/5         Lower extremity          HF(l2)   KE(l3)    TA(l4)   EHL(l5)  GS(s1)                                                 R        5/5        5/5        5/5       5/5         5/5                                               L         5/5 5/5       5/5       5/5          5/5                                                 B/L LE: warm well perfused; capillary refill <3 seconds. BCR. Calves soft NT             A/P :  41y Female S/P  ACDF POD#1  -    Pain control  -    DVT ppx-ASA 81mg daily x 28 days  -    Resume home meds  -    Physical Therapy  -    Weight bearing status: WBAT  -    Dispo: Home when cleared by medicine and PT      WILIAM [id number]    41yFemale  Other herniation of intervertebral disc of cervical spine    No pertinent family history in first degree relatives    Handoff    Radiculopathy, cervical    Other cervical disc displacement, unspecified cervical region    Spondylosis without myelopathy or radiculopathy, cervical region    Cervical spondylosis with radiculopathy    Cervical spondylosis with radiculopathy    Anterior cervical discectomy with fusion    Spondylosis of cervical spine without myelopathy    Need for prophylactic measure    Cervical radiculopathy    Cervical disc displacement    Spondylosis of cervical spine without myelopathy    Cervical spondylosis    Acute postoperative pain    Spondylosis of cervical spine with radiculopathy    Anterior cervical discectomy with fusion    H/O breast reconstruction    OTHER CERVICAL DISC DISPLACEME    Spondylosis of cervical spine without myelopathy    SysAdmin_VstLnk      POST OPERATIVE DAY #: 1  STATUS POST: Anterior cervical discectomy and fusion C4-C5                      SUBJECTIVE: Patient seen and examined at the bedside. Patient reports pain is controlled with perscribed medication. Patient is participating with PT. Patient denies acute motor or sensory changes. Patient admits to episode of Nausea, and one episode of vomiting last night. Patient otherwise, Denies CP, SOB, dizziness, HA.     OBJECTIVE:   Vital Signs Last 24 Hrs  T(C): 36.7 (02 Nov 2021 04:41), Max: 36.9 (01 Nov 2021 16:17)  T(F): 98.1 (02 Nov 2021 04:41), Max: 98.4 (01 Nov 2021 16:17)  HR: 66 (02 Nov 2021 04:41) (66 - 104)  BP: 94/66 (02 Nov 2021 04:41) (94/66 - 119/65)  BP(mean): --  RR: 17 (02 Nov 2021 04:41) (13 - 18)  SpO2: 94% (02 Nov 2021 04:41) (94% - 100%)\par     Constitutional: Alert, awake, patient has C collar           Sensation:          Upper extremity                ax        mc           m          u          r                                                         R          +           +             +           +          +                                               L           +           +             +           +          +         Lower extremity             sp         dp         saph       ravi         tibial                                                R          +           +             +           +          +                                               L           +           +             +           +          +                     Motor exam:          Upper extremity              Bi(c5)  WE(c6)  EE(c7)   FF(c8)                                                R         5/5        5/5        5/5       5/5                                               L          5/5        5/5        5/5       5/5         Lower extremity          HF(l2)   KE(l3)    TA(l4)   EHL(l5)  GS(s1)                                                 R        5/5        5/5        5/5       5/5         5/5                                               L         5/5        5/5       5/5       5/5          5/5                                                 B/L LE: warm well perfused; capillary refill <3 seconds. BCR. Calves soft NT           RN made aware of N/V-zofran given will monitor    A/P :  41y Female S/P  ACDF POD#1  -    Pain control  -    DVT ppx-ASA 81mg daily x 28 days  -    Resume home meds  -    Physical Therapy  -    Weight bearing status: WBAT  -    Dispo: Home when cleared by medicine and PT

## 2021-11-03 ENCOUNTER — TRANSCRIPTION ENCOUNTER (OUTPATIENT)
Age: 41
End: 2021-11-03

## 2021-11-03 VITALS
RESPIRATION RATE: 16 BRPM | OXYGEN SATURATION: 100 % | TEMPERATURE: 99 F | SYSTOLIC BLOOD PRESSURE: 98 MMHG | HEART RATE: 70 BPM | DIASTOLIC BLOOD PRESSURE: 68 MMHG

## 2021-11-03 LAB
ANION GAP SERPL CALC-SCNC: 7 MMOL/L — SIGNIFICANT CHANGE UP (ref 5–17)
BUN SERPL-MCNC: 11 MG/DL — SIGNIFICANT CHANGE UP (ref 8–20)
CALCIUM SERPL-MCNC: 8.1 MG/DL — LOW (ref 8.6–10.2)
CHLORIDE SERPL-SCNC: 112 MMOL/L — HIGH (ref 98–107)
CO2 SERPL-SCNC: 24 MMOL/L — SIGNIFICANT CHANGE UP (ref 22–29)
CREAT SERPL-MCNC: 0.65 MG/DL — SIGNIFICANT CHANGE UP (ref 0.5–1.3)
GLUCOSE SERPL-MCNC: 96 MG/DL — SIGNIFICANT CHANGE UP (ref 70–99)
HCT VFR BLD CALC: 30.6 % — LOW (ref 34.5–45)
HGB BLD-MCNC: 10.1 G/DL — LOW (ref 11.5–15.5)
MCHC RBC-ENTMCNC: 31.5 PG — SIGNIFICANT CHANGE UP (ref 27–34)
MCHC RBC-ENTMCNC: 33 GM/DL — SIGNIFICANT CHANGE UP (ref 32–36)
MCV RBC AUTO: 95.3 FL — SIGNIFICANT CHANGE UP (ref 80–100)
PLATELET # BLD AUTO: 224 K/UL — SIGNIFICANT CHANGE UP (ref 150–400)
POTASSIUM SERPL-MCNC: 4 MMOL/L — SIGNIFICANT CHANGE UP (ref 3.5–5.3)
POTASSIUM SERPL-SCNC: 4 MMOL/L — SIGNIFICANT CHANGE UP (ref 3.5–5.3)
RBC # BLD: 3.21 M/UL — LOW (ref 3.8–5.2)
RBC # FLD: 13.2 % — SIGNIFICANT CHANGE UP (ref 10.3–14.5)
SODIUM SERPL-SCNC: 143 MMOL/L — SIGNIFICANT CHANGE UP (ref 135–145)
WBC # BLD: 8.47 K/UL — SIGNIFICANT CHANGE UP (ref 3.8–10.5)
WBC # FLD AUTO: 8.47 K/UL — SIGNIFICANT CHANGE UP (ref 3.8–10.5)

## 2021-11-03 PROCEDURE — C1889: CPT

## 2021-11-03 PROCEDURE — 76000 FLUOROSCOPY <1 HR PHYS/QHP: CPT

## 2021-11-03 PROCEDURE — 80048 BASIC METABOLIC PNL TOTAL CA: CPT

## 2021-11-03 PROCEDURE — 86900 BLOOD TYPING SEROLOGIC ABO: CPT

## 2021-11-03 PROCEDURE — 86769 SARS-COV-2 COVID-19 ANTIBODY: CPT

## 2021-11-03 PROCEDURE — 86850 RBC ANTIBODY SCREEN: CPT

## 2021-11-03 PROCEDURE — C1713: CPT

## 2021-11-03 PROCEDURE — 85027 COMPLETE CBC AUTOMATED: CPT

## 2021-11-03 PROCEDURE — 99232 SBSQ HOSP IP/OBS MODERATE 35: CPT

## 2021-11-03 PROCEDURE — 36415 COLL VENOUS BLD VENIPUNCTURE: CPT

## 2021-11-03 PROCEDURE — 86901 BLOOD TYPING SEROLOGIC RH(D): CPT

## 2021-11-03 RX ORDER — ONDANSETRON 8 MG/1
1 TABLET, FILM COATED ORAL
Qty: 20 | Refills: 0
Start: 2021-11-03

## 2021-11-03 RX ORDER — METHOCARBAMOL 500 MG/1
1 TABLET, FILM COATED ORAL
Qty: 30 | Refills: 0
Start: 2021-11-03 | End: 2021-11-12

## 2021-11-03 RX ORDER — SENNOSIDES/DOCUSATE SODIUM 8.6MG-50MG
2 TABLET ORAL
Qty: 20 | Refills: 0
Start: 2021-11-03 | End: 2021-11-12

## 2021-11-03 RX ORDER — OXYCODONE HYDROCHLORIDE 5 MG/1
1 TABLET ORAL
Qty: 42 | Refills: 0
Start: 2021-11-03 | End: 2021-11-09

## 2021-11-03 RX ORDER — ASPIRIN/CALCIUM CARB/MAGNESIUM 324 MG
1 TABLET ORAL
Qty: 28 | Refills: 0
Start: 2021-11-03 | End: 2021-11-30

## 2021-11-03 RX ORDER — IBUPROFEN 200 MG
3 TABLET ORAL
Qty: 0 | Refills: 0 | DISCHARGE

## 2021-11-03 RX ORDER — PANTOPRAZOLE SODIUM 20 MG/1
1 TABLET, DELAYED RELEASE ORAL
Qty: 28 | Refills: 0
Start: 2021-11-03 | End: 2021-11-30

## 2021-11-03 RX ORDER — ACETAMINOPHEN 500 MG
2 TABLET ORAL
Qty: 80 | Refills: 0
Start: 2021-11-03 | End: 2021-11-12

## 2021-11-03 RX ADMIN — SODIUM CHLORIDE 3 MILLILITER(S): 9 INJECTION INTRAMUSCULAR; INTRAVENOUS; SUBCUTANEOUS at 05:10

## 2021-11-03 RX ADMIN — SODIUM CHLORIDE 125 MILLILITER(S): 9 INJECTION INTRAMUSCULAR; INTRAVENOUS; SUBCUTANEOUS at 09:05

## 2021-11-03 RX ADMIN — METHOCARBAMOL 750 MILLIGRAM(S): 500 TABLET, FILM COATED ORAL at 05:45

## 2021-11-03 RX ADMIN — PANTOPRAZOLE SODIUM 40 MILLIGRAM(S): 20 TABLET, DELAYED RELEASE ORAL at 05:46

## 2021-11-03 RX ADMIN — Medication 975 MILLIGRAM(S): at 05:30

## 2021-11-03 RX ADMIN — Medication 975 MILLIGRAM(S): at 13:00

## 2021-11-03 RX ADMIN — METHOCARBAMOL 750 MILLIGRAM(S): 500 TABLET, FILM COATED ORAL at 14:16

## 2021-11-03 RX ADMIN — Medication 975 MILLIGRAM(S): at 11:41

## 2021-11-03 RX ADMIN — Medication 975 MILLIGRAM(S): at 04:40

## 2021-11-03 RX ADMIN — SODIUM CHLORIDE 3 MILLILITER(S): 9 INJECTION INTRAMUSCULAR; INTRAVENOUS; SUBCUTANEOUS at 11:34

## 2021-11-03 RX ADMIN — Medication 81 MILLIGRAM(S): at 11:38

## 2021-11-03 NOTE — PROGRESS NOTE ADULT - SUBJECTIVE AND OBJECTIVE BOX
Pt Name: WILIAM DE LOS SANTOS    MRN: [id number]      Patient is a 41 F being followed s/p Anterior cervical discectomy and fusion C4-5. Patient is now POD #2. No acute events reported overnight. Patient was seen and evaluated at bedside. Patient is doing well with controlled pain. UE radicular symptoms are improved post-op. Patient is tolerating diet, voiding, and participating in bedside PT. No acute orthopaedic complaints are expressed today. Patient denies fever, chills, malaise, CP/SOB, abdominal pain, N/V, calf pain, numbness, or weakness.       PAST MEDICAL & SURGICAL HISTORY:  Radiculopathy, cervical    Other cervical disc displacement, unspecified cervical region    Spondylosis without myelopathy or radiculopathy, cervical region    H/O breast reconstruction        Allergies: No Known Allergies      Medications: acetaminophen     Tablet .. 975 milliGRAM(s) Oral every 6 hours  aluminum hydroxide/magnesium hydroxide/simethicone Suspension 30 milliLiter(s) Oral every 12 hours PRN  aspirin  chewable 81 milliGRAM(s) Oral daily  benzocaine 15 mG/menthol 3.6 mG (Sugar-Free) Lozenge 1 Lozenge Oral every 2 hours PRN  diazepam    Tablet 2 milliGRAM(s) Oral every 8 hours PRN  magnesium hydroxide Suspension 30 milliLiter(s) Oral every 12 hours PRN  methocarbamol 750 milliGRAM(s) Oral three times a day  ondansetron Injectable 4 milliGRAM(s) IV Push every 6 hours PRN  oxyCODONE    IR 5 milliGRAM(s) Oral every 3 hours PRN  oxyCODONE    IR 10 milliGRAM(s) Oral every 3 hours PRN  pantoprazole    Tablet 40 milliGRAM(s) Oral before breakfast  senna 2 Tablet(s) Oral at bedtime  sodium chloride 0.9% lock flush 3 milliLiter(s) IV Push every 8 hours                            10.1   8.47  )-----------( 224      ( 2021 05:28 )             30.6     11-    143  |  112<H>  |  11.0  ----------------------------<  96  4.0   |  24.0  |  0.65    Ca    8.1<L>      2021 05:28        PHYSICAL EXAM:    Vital Signs Last 24 Hrs  T(C): 36.9 (2021 08:17), Max: 36.9 (2021 08:17)  T(F): 98.5 (2021 08:17), Max: 98.5 (2021 08:17)  HR: 91 (2021 08:17) (66 - 91)  BP: 126/83 (2021 08:17) (110/71 - 126/83)  BP(mean): --  RR: 18 (2021 08:17) (18 - 20)  SpO2: 94% (2021 08:17) (94% - 96%)  Daily     Daily Weight in k.7 (2021 00:36)    Appearance: Alert, responsive, in no acute distress.    Skin: no rash on visible skin. Skin is clean, dry and intact. No bleeding. No abrasions. No ulcerations.    Musculoskeletal: Dressing clean and dry.        Left Upper Extremity: SILT C5-T1. Biceps, wrist extensors, triceps, , and intrinsics strength: 5/5.        Right Upper Extremity: SILT C5-T1. Biceps, wrist extensors, triceps, , and intrinsics strength: 5/5.        Left Lower Extremity: Calf supple/nontender.        Right Lower Extremity:  Calf supple/nontender.      A/P:  Pt is a 41y Female POD #2 s/p Anterior cervical discectomy and fusion.     PLAN:   1. Pain control.  2. PT/OT.  3. WBAT.  4. Post-op labs.  5. SCDs.  6. ASA for DVT PPx.  7. Diet, GI PPx.  8. Anticipate d/c home today.

## 2021-11-03 NOTE — DISCHARGE NOTE NURSING/CASE MANAGEMENT/SOCIAL WORK - PATIENT PORTAL LINK FT
You can access the FollowMyHealth Patient Portal offered by [facility] by registering at the following website: [email or web address]. By joining [employer]s FollowMyHealth portal, you will also be able to view your health information using other applications (apps) compatible with our system.

## 2021-11-03 NOTE — PROGRESS NOTE ADULT - SUBJECTIVE AND OBJECTIVE BOX
Patient seen and examined . S/p ACDF  , POD # 2. Feeling better , headache resolved , still with neck pain but better controlled today , no radiculopathy . No n/v , voiding without difficulty , participating with physical therapy .    CC : chronic neck pain with UE radiculopathy postop well controlled , radiculopathy resolved         MEDICATIONS  (STANDING):  acetaminophen     Tablet .. 975 milliGRAM(s) Oral every 6 hours  aspirin  chewable 81 milliGRAM(s) Oral daily  methocarbamol 750 milliGRAM(s) Oral three times a day  pantoprazole    Tablet 40 milliGRAM(s) Oral before breakfast  senna 2 Tablet(s) Oral at bedtime  sodium chloride 0.9% lock flush 3 milliLiter(s) IV Push every 8 hours    MEDICATIONS  (PRN):  aluminum hydroxide/magnesium hydroxide/simethicone Suspension 30 milliLiter(s) Oral every 12 hours PRN Indigestion  benzocaine 15 mG/menthol 3.6 mG (Sugar-Free) Lozenge 1 Lozenge Oral every 2 hours PRN Sore Throat  diazepam    Tablet 2 milliGRAM(s) Oral every 8 hours PRN Anxiety OR SPASM REFRACTORY TO CURRENT MEDS  magnesium hydroxide Suspension 30 milliLiter(s) Oral every 12 hours PRN Constipation  ondansetron Injectable 4 milliGRAM(s) IV Push every 6 hours PRN Nausea  oxyCODONE    IR 5 milliGRAM(s) Oral every 3 hours PRN Moderate Pain (4 - 6)  oxyCODONE    IR 10 milliGRAM(s) Oral every 3 hours PRN Severe Pain (7 - 10)      LABS:                          10.1   8.47  )-----------( 224      ( 03 Nov 2021 05:28 )             30.6     11-03    143  |  112<H>  |  11.0  ----------------------------<  96  4.0   |  24.0  |  0.65    Ca    8.1<L>      03 Nov 2021 05:28          REVIEW OF SYSTEMS:    As above , all otheer systems are reviewed and are negative .     Vital Signs Last 24 Hrs  T(C): 36.9 (03 Nov 2021 08:17), Max: 36.9 (03 Nov 2021 08:17)  T(F): 98.5 (03 Nov 2021 08:17), Max: 98.5 (03 Nov 2021 08:17)  HR: 91 (03 Nov 2021 08:17) (66 - 91)  BP: 126/83 (03 Nov 2021 08:17) (110/71 - 126/83)  BP(mean): --  RR: 18 (03 Nov 2021 08:17) (18 - 20)  SpO2: 94% (03 Nov 2021 08:17) (94% - 96%)    PHYSICAL EXAM:    GENERAL: NAD, well-groomed, well-developed  HEAD:  Atraumatic, Normocephalic  EYES: EOMI, PERRLA, conjunctiva and sclera clear  NECK: Supple, No JVD, Normal thyroid , anterior neck dry and clean dressing +   NERVOUS SYSTEM:  Alert & Oriented X3, no focal deficit  CHEST/LUNG: CTA b/l ,  no  rales, rhonchi, wheezing, or rubs  HEART: Regular rate and rhythm; No murmurs, rubs, or gallops  ABDOMEN: Soft, Nontender, Nondistended; Bowel sounds present  EXTREMITIES:  2+ Peripheral Pulses, No clubbing, cyanosis, or edema  LYMPH: No lymphadenopathy noted  SKIN: No rashes or lesions

## 2021-11-03 NOTE — DISCHARGE NOTE NURSING/CASE MANAGEMENT/SOCIAL WORK - NSDCVIVACCINE_GEN_ALL_CORE_FT
Tdap; 25-Nov-2016 03:41; Panda Roach (RN); [employer]; [id number]; IntraMuscular; Deltoid Right.; 0.5 milliLiter(s); VIS (VIS Published: 09-May-2013, VIS Presented: 25-Nov-2016);

## 2021-11-03 NOTE — PROGRESS NOTE ADULT - ASSESSMENT
40 y/o female  without significant PMH Pt. states she has had pain in her neck since 2019, she states she slipped in a supermarket and injured herself. She states she has had PT, and has tried other medications and injections since her injury which have provided her no relief,  pain  in her neck, radiating  to her shoulder. Pain is relieved by advil, pain gives her HAs, and pain is worsened by being at work as the work is heavy and worse with the physical demands of her job. Admits to numbness/tingling when she wakes up of both arms intermittently, and sometimes just her right arm.      Problem/Recommendation - 1:  ·  Problem: Spondylosis of cervical spine without myelopathy.   ·  Recommendation: s/p ACDF,   PT/OT/pain mgmt  DVT prophylaxis- as per ortho  Abx as per SCIP- given   Incentive spirometry  Prophylaxis of opioid  induced constipation.       Problem/Recommendation - 2:  ·  Problem: Acute postoperative pain, better controlled today   c/o headache - observe   ·  Recommendation: - continue  pain mgmt.     Problem/Recommendation - 3:  ·  Problem: Need for prophylactic measure.   ·  Recommendation: DVT prophylaxis  - as per ortho protocol  Opioid induced constipation  prophylaxis - bowel regimen     Problem / Plan - BP on lower side - well controlled with ivf .   Will d/c ivf , labs noted and reviewed    Medically stable to d/c once cleared by physical therapy / ortho   D/P patient / nurse / ortho PA.

## 2021-11-09 ENCOUNTER — APPOINTMENT (OUTPATIENT)
Dept: ORTHOPEDIC SURGERY | Facility: CLINIC | Age: 41
End: 2021-11-09
Payer: COMMERCIAL

## 2021-11-09 PROCEDURE — 72040 X-RAY EXAM NECK SPINE 2-3 VW: CPT

## 2021-11-09 PROCEDURE — 99024 POSTOP FOLLOW-UP VISIT: CPT

## 2021-11-09 RX ORDER — DIAZEPAM 2 MG/1
2 TABLET ORAL 3 TIMES DAILY
Qty: 21 | Refills: 0 | Status: ACTIVE | COMMUNITY
Start: 2021-11-09 | End: 1900-01-01

## 2021-11-09 RX ORDER — OXYCODONE 5 MG/1
5 TABLET ORAL EVERY 4 HOURS
Qty: 42 | Refills: 0 | Status: ACTIVE | COMMUNITY
Start: 2021-11-09 | End: 1900-01-01

## 2021-11-09 NOTE — HISTORY OF PRESENT ILLNESS
[Clean/Dry/Intact] : clean, dry and intact [Healed] : healed [Neuro Intact] : an unremarkable neurological exam [Vascular Intact] : ~T peripheral vascular exam normal [Doing Well] : is doing well [Excellent Pain Control] : has excellent pain control [No Sign of Infection] : is showing no signs of infection [Chills] : no chills [Fever] : no fever [Erythema] : not erythematous [Discharge] : absent of discharge [Swelling] : not swollen [Dehiscence] : not dehisced [de-identified] : S/p ACDF C4-C5 and C5-C6,.  [de-identified] : 41 year old F S/p 2 level ACDF. She states there is significant posterior cervical pain post operatively, particularly worse with sleeping. She is taking all her post operative medications. She states she is having daily headaches. There is discomfort with swallowing. ROMERO questionnaire negative.  [de-identified] : constitutional- No acute distress\par neurologic- no LE radiculitis.\par skin- incision dry clean and intact. The incision was well healed. Incision was cleansed with Chlora-prep and Tega-Derm was applied. \par musculoskeletal - motor strength is 5/5. significant Posterior cervical myositis and spasm.  [de-identified] : Xray of a cervical spine taken 11/09/2021 demonstrates S/p 2 level ACDF. surgical implants appear well positioned.  [de-identified] : s/p 2 level ACDF.  [de-identified] : Valium 2MG BID to TID for one week was prescribed. I believe this patient is suffering from significant post operative cervical spasms. Incisional care was discussed and patient was educated regarding appropriate incisional care for this point in the post operative period. We discussed appropriate showering for the post operative period. Patient inquired about a return to work. She works as a  / office work, a return to this work is reasonable at 4 weeks post operatively. 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 Based on current exam findings I currently deem this patient 100% temporarily disabled. The patient will follow up in 2 weeks for a repeat clinical assessment.

## 2021-12-07 ENCOUNTER — APPOINTMENT (OUTPATIENT)
Dept: ORTHOPEDIC SURGERY | Facility: CLINIC | Age: 41
End: 2021-12-07
Payer: COMMERCIAL

## 2021-12-07 PROCEDURE — 72040 X-RAY EXAM NECK SPINE 2-3 VW: CPT

## 2021-12-07 PROCEDURE — 99024 POSTOP FOLLOW-UP VISIT: CPT

## 2021-12-07 NOTE — HISTORY OF PRESENT ILLNESS
[Clean/Dry/Intact] : clean, dry and intact [Healed] : healed [Neuro Intact] : an unremarkable neurological exam [Vascular Intact] : ~T peripheral vascular exam normal [Doing Well] : is doing well [Excellent Pain Control] : has excellent pain control [No Sign of Infection] : is showing no signs of infection [Chills] : no chills [Fever] : no fever [Erythema] : not erythematous [Discharge] : absent of discharge [Swelling] : not swollen [Dehiscence] : not dehisced [de-identified] : S/p ACDF C4-C5 and C5-C6 DOS 11- [de-identified] : 41 year old F S/p 2 level ACDF. She still has posterior cervical pain and myositis that is worse with prolonged positioning. Slightly improved from last visit. No radiating pain down the arms or legs. 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 She is taking muscle relaxants and Advil.  [de-identified] : constitutional- No acute distress\par neurologic- no LE radiculitis.\par skin- incision dry clean and intact. The incision was well healed.\par musculoskeletal - motor strength is 5/5. Expected Posterior cervical myositis.  [de-identified] : Xray of a cervical spine taken 12/07/2021 demonstrates  S/p 2 level ACDF. surgical implants appear well positioned.  [de-identified] : s/p 2 level ACDF.  [de-identified] : patient is able to return to her day job which entails  to the elderly, does not entail any heavy lifting, repetitive bending etc. and she has no restrictions for that job. Cease antiinflammatories, We also spoke about the benefits of using heat, application of ice to the area 2-3x daily for 20 minutes after periods of activity, and Bengay cream. Use of Tylenol 500 MG 2-3 times daily / PRN was advised. Patient was prescribed Methocarbamol 750Mg QD / PRN spasm. Restart NSAIDs 6 weeks S/p. The patient will follow up in 4 - 6 weeks for a repeat clinical assessment.

## 2022-01-18 ENCOUNTER — APPOINTMENT (OUTPATIENT)
Dept: ORTHOPEDIC SURGERY | Facility: CLINIC | Age: 42
End: 2022-01-18
Payer: COMMERCIAL

## 2022-01-18 VITALS
BODY MASS INDEX: 23.22 KG/M2 | HEIGHT: 64 IN | WEIGHT: 136 LBS | HEART RATE: 82 BPM | DIASTOLIC BLOOD PRESSURE: 69 MMHG | SYSTOLIC BLOOD PRESSURE: 120 MMHG

## 2022-01-18 DIAGNOSIS — M47.812 SPONDYLOSIS W/OUT MYELOPATHY OR RADICULOPATHY, CERVICAL REGION: ICD-10-CM

## 2022-01-18 PROCEDURE — 99024 POSTOP FOLLOW-UP VISIT: CPT

## 2022-01-18 PROCEDURE — 72040 X-RAY EXAM NECK SPINE 2-3 VW: CPT

## 2022-01-18 RX ORDER — NAPROXEN 500 MG/1
500 TABLET ORAL
Qty: 60 | Refills: 1 | Status: ACTIVE | COMMUNITY
Start: 2022-01-18 | End: 1900-01-01

## 2022-01-18 NOTE — PHYSICAL EXAM
[Poor Appearance] : well-appearing [Acute Distress] : not in acute distress [Obese] : not obese [de-identified] : CONSTITUTIONAL: Patient is a very pleasant individual who is well-nourished and appears stated age. \par PSYCHIATRIC: Alert and oriented times three and in no apparent distress, and participates with orthopedic evaluation well.\par HEAD: Atraumatic and nonsyndromic in appearance.\par EENT: No thyromegaly, EOMI.\par RESPIRATORY: Respiratory rate is regular, not dyspneic on examination.\par LYMPHATICS: There is no cervical or axillary lymphadenopathy.\par INTEGUMENTARY: Skin is clean, dry, and intact about the bilateral upper extremities and cervical spine. \par VASCULAR: There is brisk capillary refill about the bilateral upper extremities and radial pulses are 2/4. \par NEUROLOGIC: Negative L'hirmitte, negative Spurling’s sign. There are no pathologic reflexes. There is no decrease in sensation of the bilateral upper extremities on Wartenberg pinwheel examination. Deep tendon reflexes are well-maintained at +2/4 of the bilateral upper extremities and are symmetric.\par MUSCULOSKELETAL: There is no visible muscular atrophy. Manual motor strength is well maintained in the bilateral upper extremities. Cervical range of motion is well maintained. The patient ambulates in a non-myelopathic manner. Normal secondary orthopaedic exam of bilateral shoulders, elbows and hands. Elbow flexion and extension, wrist extension, finger flexion and abduction are well maintained. Posterior cervical myositis.  [de-identified] : Xray of a cervical spine taken 12/07/2021 demonstrates S/p 2 level ACDF. surgical implants appear well positioned.

## 2022-01-18 NOTE — HISTORY OF PRESENT ILLNESS
[de-identified] : 41 year old F S/p 2 level ACDF. States her UE radiculopathy is completely resolved, she now only complains of posterior neck pain. Patient states that therapy and home exercises have only minimally improving her neck pain. No dysphagia or Dysphonia.  [Ataxia] : no ataxia [Incontinence] : no incontinence [Loss of Dexterity] : good dexterity [Urinary Ret.] : no urinary retention

## 2022-01-18 NOTE — ADDENDUM
[FreeTextEntry1] : Documented by Mk Blanc acting as a scribe for Dr. Corby Perla on 01/18/2022. All medical record entries made by the Scribe were at my, Dr. Corby Perla, direction and personally dictated by me on 01/18/2022 . I have reviewed the chart and agree that the record accurately reflects my personal performance of the history, physical exam, assessment and plan. I have also personally directed, reviewed, and agreed with the chart.

## 2022-01-18 NOTE — DISCUSSION/SUMMARY
[de-identified] : We talked about the nature of the condition and treatment options. Anticipatory guidance regarding Posterior cervical myositis was given. Patient has been referred to physical therapy for decreased pain modalities, stretching and strengthening modalities, soft tissue modalities, and physical modalities. We also spoke about the benefits of using heat, application of ice to the area 2-3x daily for 20 minutes after periods of activity, and Bengay cream. I advised the patient on antiinflammatory use such as ibuprofen 600 MG BID / PRN pain not to exceed 1200MG daily. The patient will follow up in 2 months for a repeat clinical assessment, if pain persists she may be referred for injections. \par \par In regard to work I believe 3 months out of work is reasonable S/p ACDF, If patient feels incapable of returning to work patient may be referred for a  functional evaluation at [facility]. \par \par Prior to appointment and during encounter with patient extensive medical records were reviewed including but not limited to, hospital records, out patient records, imaging results, and lab data. During this appointment the patient was examined, diagnoses were discussed and explained in a face to face manner. In addition extensive time was spent reviewing aforementioned diagnostic studies. Counseling including abnormal image results, differential diagnoses, treatment options, risk and benefits, lifestyle changes, current condition, and current medications was performed. Patient's comments, questions, and concerns were address and patient verbalized understanding. Based on this patient's presentation at our office, which is an orthopedic spine surgeon's office, this patient inherently / intrinsically has a risk, however minute, of developing  issues such as Cauda equina syndrome, bowel and bladder changes, or progression of motor or neurological deficits such as paralysis which may be permanent.

## 2022-03-18 ENCOUNTER — APPOINTMENT (OUTPATIENT)
Dept: ORTHOPEDIC SURGERY | Facility: CLINIC | Age: 42
End: 2022-03-18
Payer: COMMERCIAL

## 2022-03-18 VITALS
BODY MASS INDEX: 23.22 KG/M2 | WEIGHT: 136 LBS | DIASTOLIC BLOOD PRESSURE: 67 MMHG | HEART RATE: 73 BPM | SYSTOLIC BLOOD PRESSURE: 111 MMHG | HEIGHT: 64 IN

## 2022-03-18 PROCEDURE — 72040 X-RAY EXAM NECK SPINE 2-3 VW: CPT

## 2022-03-18 PROCEDURE — 99213 OFFICE O/P EST LOW 20 MIN: CPT

## 2022-03-18 NOTE — PHYSICAL EXAM
[Poor Appearance] : well-appearing [Acute Distress] : not in acute distress [Obese] : not obese [de-identified] : CONSTITUTIONAL: Patient is a very pleasant individual who is well-nourished and appears stated age. \par PSYCHIATRIC: Alert and oriented times three and in no apparent distress, and participates with orthopedic evaluation well.\par HEAD: Atraumatic and nonsyndromic in appearance.\par EENT: No thyromegaly, EOMI.\par RESPIRATORY: Respiratory rate is regular, not dyspneic on examination.\par LYMPHATICS: There is no cervical or axillary lymphadenopathy.\par INTEGUMENTARY: Skin is clean, dry, and intact about the bilateral upper extremities and cervical spine. \par VASCULAR: There is brisk capillary refill about the bilateral upper extremities and radial pulses are 2/4. \par NEUROLOGIC: Negative L'hirmitte, negative Spurling’s sign. There are no pathologic reflexes. Positive Phalen’s test and Tinel's sign. Deep tendon reflexes are well-maintained at +2/4 of the bilateral upper extremities and are symmetric.\par MUSCULOSKELETAL: There is no visible muscular atrophy. Manual motor strength is well maintained in the bilateral upper extremities. Cervical range of motion is well maintained. The patient ambulates in a non-myelopathic manner. Normal secondary orthopaedic exam of bilateral shoulders, elbows and hands. Elbow flexion and extension, wrist extension, finger flexion and abduction are well maintained. Posterior cervical myositis.  [de-identified] : Ap and lateral views of the cervical spine taken 12/07/2021 demonstrates S/p 2 level ACDF. surgical implants appear well positioned. \par \par AP and Lateral views of the cervical spine taken 03/18/2022 demonstrates S/p 2 level ACDF, no changes in implant position.

## 2022-03-18 NOTE — HISTORY OF PRESENT ILLNESS
[de-identified] : 41 year old F S/p 2 level ACDF. She states she still experiences some posterior cervical discomfort. She states the pain is in the back of the neck and travels into the shoulders. She has been enrolled in PT for 2 months at this time. No radiculopathy complaints. She does complain of some focal hand numbness when the hand is in one position for a prolonged period, such as waking up in the morning or using the phone.  [Ataxia] : no ataxia [Incontinence] : no incontinence [Loss of Dexterity] : good dexterity [Urinary Ret.] : no urinary retention

## 2022-03-18 NOTE — DISCUSSION/SUMMARY
[de-identified] : We talked about the nature of the condition and treatment options. Anticipatory guidance regarding Posterior cervical myositis was given. Patient referred to neurology for EMG testing to assess for Carpal tunnel syndrome. Continuation of PT was discussed at length with the patient and recommended at this time. The patient will follow up in 2 months for a repeat clinical assessment. \par \par Prior to appointment and during encounter with patient extensive medical records were reviewed including but not limited to, hospital records, out patient records, imaging results, and lab data. During this appointment the patient was examined, diagnoses were discussed and explained in a face to face manner. In addition extensive time was spent reviewing aforementioned diagnostic studies. Counseling including abnormal image results, differential diagnoses, treatment options, risk and benefits, lifestyle changes, current condition, and current medications was performed. Patient's comments, questions, and concerns were address and patient verbalized understanding. Based on this patient's presentation at our office, which is an orthopedic spine surgeon's office, this patient inherently / intrinsically has a risk, however minute, of developing  issues such as Cauda equina syndrome, bowel and bladder changes, or progression of motor or neurological deficits such as paralysis which may be permanent.

## 2022-03-18 NOTE — ADDENDUM
[FreeTextEntry1] : Documented by Mk Blanc acting as a scribe for Dr. Corby Perla on 03/18/2022. All medical record entries made by the Scribe were at my, Dr. Corby Perla, direction and personally dictated by me on 03/18/2022 . I have reviewed the chart and agree that the record accurately reflects my personal performance of the history, physical exam, assessment and plan. I have also personally directed, reviewed, and agreed with the chart. 
(0) independent

## 2022-05-13 ENCOUNTER — APPOINTMENT (OUTPATIENT)
Dept: ORTHOPEDIC SURGERY | Facility: CLINIC | Age: 42
End: 2022-05-13
Payer: COMMERCIAL

## 2022-05-13 VITALS
WEIGHT: 136 LBS | HEART RATE: 59 BPM | SYSTOLIC BLOOD PRESSURE: 97 MMHG | DIASTOLIC BLOOD PRESSURE: 61 MMHG | HEIGHT: 64 IN | BODY MASS INDEX: 23.22 KG/M2

## 2022-05-13 PROCEDURE — 73030 X-RAY EXAM OF SHOULDER: CPT | Mod: LT

## 2022-05-13 PROCEDURE — 72040 X-RAY EXAM NECK SPINE 2-3 VW: CPT

## 2022-05-13 PROCEDURE — 99213 OFFICE O/P EST LOW 20 MIN: CPT

## 2022-05-23 ENCOUNTER — OUTPATIENT (OUTPATIENT)
Dept: OUTPATIENT SERVICES | Facility: HOSPITAL | Age: 42
LOS: 1 days | End: 2022-05-23

## 2022-05-23 ENCOUNTER — APPOINTMENT (OUTPATIENT)
Dept: MRI IMAGING | Facility: CLINIC | Age: 42
End: 2022-05-23
Payer: COMMERCIAL

## 2022-05-23 DIAGNOSIS — Z98.890 OTHER SPECIFIED POSTPROCEDURAL STATES: Chronic | ICD-10-CM

## 2022-05-23 DIAGNOSIS — M75.02 ADHESIVE CAPSULITIS OF LEFT SHOULDER: ICD-10-CM

## 2022-05-23 PROCEDURE — 73221 MRI JOINT UPR EXTREM W/O DYE: CPT | Mod: 26,LT

## 2022-06-17 ENCOUNTER — APPOINTMENT (OUTPATIENT)
Dept: ORTHOPEDIC SURGERY | Facility: CLINIC | Age: 42
End: 2022-06-17
Payer: COMMERCIAL

## 2022-06-17 VITALS
HEART RATE: 62 BPM | WEIGHT: 130 LBS | TEMPERATURE: 98.1 F | BODY MASS INDEX: 23.04 KG/M2 | HEIGHT: 63 IN | DIASTOLIC BLOOD PRESSURE: 66 MMHG | SYSTOLIC BLOOD PRESSURE: 103 MMHG

## 2022-06-17 DIAGNOSIS — M75.02 ADHESIVE CAPSULITIS OF LEFT SHOULDER: ICD-10-CM

## 2022-06-17 PROCEDURE — 99213 OFFICE O/P EST LOW 20 MIN: CPT

## 2022-06-17 NOTE — PHYSICAL EXAM
[de-identified] : General:\par Awake, alert, no acute distress, Patient was cooperative and appropriate during the examination.\par \par The patient is of normal weight for height and age.\par \par Walks without an antalgic gait.\par \par Full, painless range of motion of the neck and back.\par \par Exam of the bilateral lower extremities is intact and symmetric with regards to dermatologic, vascular, and neurologic exam. Bilateral lower extremity sensation is grossly intact to light touch in the DP/SP/T/S/S nerve distributions. Intact DF/PF/EHL. BIlateral lower extremities warm and well-perfused with brisk capillary refill.\par \par \par Pulmonary:\par Regular, nonlabored breathing\par \par Abdomen:\par Soft, nontender, nondistended.\par \par Lymphatic:\par No evidence of axillary lymphadenopathy\par \par Left shoulder Exam:\par Physical exam of the shoulder demonstrates normal skin without signs of skin changes or abnormalities. No erythema, warmth, or joint effusion appreciated.  \par  \par Sensation intact light touch C5-T1\par Palpable radial pulse\par Radial/ulnar/median/axillary/musculocutaneous/AIN/PIN nerves grossly intact\par  \par Range of motion:\par Forward Flexion: 180 with mild discomfort at terminal degrees of flexion\par Abduction: 150\par External Rotation: 70\par Internal Rotation: Mid lumbar level with some discomfort\par  \par Palpation:\par Mildly tender to palpation over the glenohumeral joint\par Mildly tender over the greater tuberosity\par Mildly tender to palpation over the AC joint\par Moderately tender to palpation of the biceps tendon/bicipital groove\par Mildly tender over the trapezial muscle groups\par  \par Strength testing:\par Supraspinatus: 5/5\par Infraspinatus: 5/5\par Subscapularis: 5/5\par  \par Special test:\par Empty can test positive\par Melton impingement test positive\par Speeds test positive\par Monroe's test negative\par Lift-off test negative\par Bell-press test negative\par Cross-arm adduction test negative\par  \par   [de-identified] : MRI taken at [facility] of the left shoulder on May 27, 2022 revealed mild to moderate supraspinatus and infraspinatus tendinosis as well as bicipital tendinosis and thickening of the inferior joint capsule and inferior glenohumeral ligament with mild thickening of the coracohumeral ligament.

## 2022-06-17 NOTE — DISCUSSION/SUMMARY
[de-identified] : Assessment: Patient a 42-year-old female with left shoulder tendinitis and bursitis status post ACDF by Dr. Perla\par \par Plan: I had a long discussion with the patient today regarding the nature of their diagnosis and treatment plan. We discussed the risks and benefits of no treatment as well as nonoperative and operative treatments.  I reviewed the MRI results with her today that showed some mild evidence of adhesive capsulitis and tendinitis with no other acute findings.  On examination the patient has tenderness posteriorly about the trapezial and parascapular muscles as well as anteriorly about the long head of the biceps tendon in its groove.  At this time I am recommending continued conservative treatment including ice, heat, rest, anti-inflammatory medication, physical therapy for pain and inflammation.  GI precautions were discussed.  She will avoid exacerbating activities and I do feel that some of her symptoms on examination today are coming from her cervical spine.  She will continue with physical therapy.  A new referral was provided today.  I also referred her for an ultrasound-guided left shoulder biceps tendon sheath cortisone injection for diagnostic and therapeutic purposes.  She will follow-up with us in 6 to 8 weeks for repeat evaluation.   Patient seen and examined with Dr. Lopez today.\par  \par The patient verbalizes their understanding and agrees with the plan.  All questions were answered to their satisfaction.

## 2022-06-17 NOTE — HISTORY OF PRESENT ILLNESS
[de-identified] : 06/17/2022 : WILIAM DE LOS SANTOS  is a 42 year year old female who presents to the office for evaluation of her left shoulder today.  She states she had an injury back in 2019 for her neck and shoulder and was treated nonoperatively initially with physical therapy for her neck and shoulder, trigger point injections, multiple injections into her shoulder.  She states the injections given into her neck and shoulder did not give her significant relief and they decided to proceed with ACDF.  She states she has done well postoperatively but continues to have numbness and tingling into the hands and pain in the shoulder and cracking and clicking throughout the shoulder as well as tenderness to the touch.  She is here to discuss her options today.  She had a recent MRI and is here to discuss  the results.  She has no other complaints today.  She denies any new or recent injury.

## 2022-06-21 NOTE — ED PROVIDER NOTE - CROS ED GU ALL NEG
High Dose Vitamin A Counseling: Side effects reviewed, pt to contact office should one occur. Birth Control Pills Pregnancy And Lactation Text: This medication should be avoided if pregnant and for the first 30 days post-partum. Use Enhanced Medication Counseling?: No Tazorac Counseling:  Patient advised that medication is irritating and drying.  Patient may need to apply sparingly and wash off after an hour before eventually leaving it on overnight.  The patient verbalized understanding of the proper use and possible adverse effects of tazorac.  All of the patient's questions and concerns were addressed. Topical Sulfur Applications Pregnancy And Lactation Text: This medication is Pregnancy Category C and has an unknown safety profile during pregnancy. It is unknown if this topical medication is excreted in breast milk. Benzoyl Peroxide Counseling: Patient counseled that medicine may cause skin irritation and bleach clothing.  In the event of skin irritation, the patient was advised to reduce the amount of the drug applied or use it less frequently.   The patient verbalized understanding of the proper use and possible adverse effects of benzoyl peroxide.  All of the patient's questions and concerns were addressed. Sarecycline Pregnancy And Lactation Text: This medication is Pregnancy Category D and not consider safe during pregnancy. It is also excreted in breast milk. Erythromycin Counseling:  I discussed with the patient the risks of erythromycin including but not limited to GI upset, allergic reaction, drug rash, diarrhea, increase in liver enzymes, and yeast infections. Aklief counseling:  Patient advised to apply a pea-sized amount only at bedtime and wait 30 minutes after washing their face before applying.  If too drying, patient may add a non-comedogenic moisturizer.  The most commonly reported side effects including irritation, redness, scaling, dryness, stinging, burning, itching, and increased risk of sunburn.  The patient verbalized understanding of the proper use and possible adverse effects of retinoids.  All of the patient's questions and concerns were addressed. Azithromycin Pregnancy And Lactation Text: This medication is considered safe during pregnancy and is also secreted in breast milk. Tazorac Pregnancy And Lactation Text: This medication is not safe during pregnancy. It is unknown if this medication is excreted in breast milk. High Dose Vitamin A Pregnancy And Lactation Text: High dose vitamin A therapy is contraindicated during pregnancy and breast feeding. Dapsone Counseling: I discussed with the patient the risks of dapsone including but not limited to hemolytic anemia, agranulocytosis, rashes, methemoglobinemia, kidney failure, peripheral neuropathy, headaches, GI upset, and liver toxicity.  Patients who start dapsone require monitoring including baseline LFTs and weekly CBCs for the first month, then every month thereafter.  The patient verbalized understanding of the proper use and possible adverse effects of dapsone.  All of the patient's questions and concerns were addressed. Winlevi Counseling:  I discussed with the patient the risks of topical clascoterone including but not limited to erythema, scaling, itching, and stinging. Patient voiced their understanding. Benzoyl Peroxide Pregnancy And Lactation Text: This medication is Pregnancy Category C. It is unknown if benzoyl peroxide is excreted in breast milk. Spironolactone Counseling: Patient advised regarding risks of diarrhea, abdominal pain, hyperkalemia, birth defects (for female patients), liver toxicity and renal toxicity. The patient may need blood work to monitor liver and kidney function and potassium levels while on therapy. The patient verbalized understanding of the proper use and possible adverse effects of spironolactone.  All of the patient's questions and concerns were addressed. Erythromycin Pregnancy And Lactation Text: This medication is Pregnancy Category B and is considered safe during pregnancy. It is also excreted in breast milk. Minocycline Counseling: Patient advised regarding possible photosensitivity and discoloration of the teeth, skin, lips, tongue and gums.  Patient instructed to avoid sunlight, if possible.  When exposed to sunlight, patients should wear protective clothing, sunglasses, and sunscreen.  The patient was instructed to call the office immediately if the following severe adverse effects occur:  hearing changes, easy bruising/bleeding, severe headache, or vision changes.  The patient verbalized understanding of the proper use and possible adverse effects of minocycline.  All of the patient's questions and concerns were addressed. Topical Clindamycin Counseling: Patient counseled that this medication may cause skin irritation or allergic reactions.  In the event of skin irritation, the patient was advised to reduce the amount of the drug applied or use it less frequently.   The patient verbalized understanding of the proper use and possible adverse effects of clindamycin.  All of the patient's questions and concerns were addressed. Bactrim Counseling:  I discussed with the patient the risks of sulfa antibiotics including but not limited to GI upset, allergic reaction, drug rash, diarrhea, dizziness, photosensitivity, and yeast infections.  Rarely, more serious reactions can occur including but not limited to aplastic anemia, agranulocytosis, methemoglobinemia, blood dyscrasias, liver or kidney failure, lung infiltrates or desquamative/blistering drug rashes. Winlevi Pregnancy And Lactation Text: This medication is considered safe during pregnancy and breastfeeding. Dapsone Pregnancy And Lactation Text: This medication is Pregnancy Category C and is not considered safe during pregnancy or breast feeding. Aklief Pregnancy And Lactation Text: It is unknown if this medication is safe to use during pregnancy.  It is unknown if this medication is excreted in breast milk.  Breastfeeding women should use the topical cream on the smallest area of the skin for the shortest time needed while breastfeeding.  Do not apply to nipple and areola. Topical Retinoid counseling:  Patient advised to apply a pea-sized amount only at bedtime and wait 30 minutes after washing their face before applying.  If too drying, patient may add a non-comedogenic moisturizer. The patient verbalized understanding of the proper use and possible adverse effects of retinoids.  All of the patient's questions and concerns were addressed. Spironolactone Pregnancy And Lactation Text: This medication can cause feminization of the male fetus and should be avoided during pregnancy. The active metabolite is also found in breast milk. Isotretinoin Counseling: Patient should get monthly blood tests, not donate blood, not drive at night if vision affected, not share medication, and not undergo elective surgery for 6 months after tx completed. Side effects reviewed, pt to contact office should one occur. Detail Level: Zone negative... Bactrim Pregnancy And Lactation Text: This medication is Pregnancy Category D and is known to cause fetal risk.  It is also excreted in breast milk. Topical Clindamycin Pregnancy And Lactation Text: This medication is Pregnancy Category B and is considered safe during pregnancy. It is unknown if it is excreted in breast milk. Doxycycline Counseling:  Patient counseled regarding possible photosensitivity and increased risk for sunburn.  Patient instructed to avoid sunlight, if possible.  When exposed to sunlight, patients should wear protective clothing, sunglasses, and sunscreen.  The patient was instructed to call the office immediately if the following severe adverse effects occur:  hearing changes, easy bruising/bleeding, severe headache, or vision changes.  The patient verbalized understanding of the proper use and possible adverse effects of doxycycline.  All of the patient's questions and concerns were addressed. Azelaic Acid Counseling: Patient counseled that medicine may cause skin irritation and to avoid applying near the eyes.  In the event of skin irritation, the patient was advised to reduce the amount of the drug applied or use it less frequently.   The patient verbalized understanding of the proper use and possible adverse effects of azelaic acid.  All of the patient's questions and concerns were addressed. Topical Retinoid Pregnancy And Lactation Text: This medication is Pregnancy Category C. It is unknown if this medication is excreted in breast milk. Tetracycline Counseling: Patient counseled regarding possible photosensitivity and increased risk for sunburn.  Patient instructed to avoid sunlight, if possible.  When exposed to sunlight, patients should wear protective clothing, sunglasses, and sunscreen.  The patient was instructed to call the office immediately if the following severe adverse effects occur:  hearing changes, easy bruising/bleeding, severe headache, or vision changes.  The patient verbalized understanding of the proper use and possible adverse effects of tetracycline.  All of the patient's questions and concerns were addressed. Patient understands to avoid pregnancy while on therapy due to potential birth defects. Isotretinoin Pregnancy And Lactation Text: This medication is Pregnancy Category X and is considered extremely dangerous during pregnancy. It is unknown if it is excreted in breast milk. Birth Control Pills Counseling: Birth Control Pill Counseling: I discussed with the patient the potential side effects of OCPs including but not limited to increased risk of stroke, heart attack, thrombophlebitis, deep venous thrombosis, hepatic adenomas, breast changes, GI upset, headaches, and depression.  The patient verbalized understanding of the proper use and possible adverse effects of OCPs. All of the patient's questions and concerns were addressed. Topical Sulfur Applications Counseling: Topical Sulfur Counseling: Patient counseled that this medication may cause skin irritation or allergic reactions.  In the event of skin irritation, the patient was advised to reduce the amount of the drug applied or use it less frequently.   The patient verbalized understanding of the proper use and possible adverse effects of topical sulfur application.  All of the patient's questions and concerns were addressed. Azelaic Acid Pregnancy And Lactation Text: This medication is considered safe during pregnancy and breast feeding. Azithromycin Counseling:  I discussed with the patient the risks of azithromycin including but not limited to GI upset, allergic reaction, drug rash, diarrhea, and yeast infections. Sarecycline Counseling: Patient advised regarding possible photosensitivity and discoloration of the teeth, skin, lips, tongue and gums.  Patient instructed to avoid sunlight, if possible.  When exposed to sunlight, patients should wear protective clothing, sunglasses, and sunscreen.  The patient was instructed to call the office immediately if the following severe adverse effects occur:  hearing changes, easy bruising/bleeding, severe headache, or vision changes.  The patient verbalized understanding of the proper use and possible adverse effects of sarecycline.  All of the patient's questions and concerns were addressed. Doxycycline Pregnancy And Lactation Text: This medication is Pregnancy Category D and not consider safe during pregnancy. It is also excreted in breast milk but is considered safe for shorter treatment courses.

## 2022-08-19 ENCOUNTER — APPOINTMENT (OUTPATIENT)
Dept: ORTHOPEDIC SURGERY | Facility: CLINIC | Age: 42
End: 2022-08-19

## 2022-08-19 VITALS
WEIGHT: 130 LBS | HEART RATE: 63 BPM | BODY MASS INDEX: 23.04 KG/M2 | SYSTOLIC BLOOD PRESSURE: 110 MMHG | DIASTOLIC BLOOD PRESSURE: 70 MMHG | HEIGHT: 63 IN

## 2022-08-19 DIAGNOSIS — M60.9 MYOSITIS, UNSPECIFIED: ICD-10-CM

## 2022-08-19 PROCEDURE — 99214 OFFICE O/P EST MOD 30 MIN: CPT

## 2022-08-19 NOTE — DISCUSSION/SUMMARY
[Medication Risks Reviewed] : Medication risks reviewed [de-identified] : Conservative treatment was discussed with the patient at length. Anticipatory guidance regarding disease process Left shoulder tendinitis, status post cervical fusion with myositis, avoidance of acute exacerbation this was discussed at length and all patients commenting concerns were answered to the patient's satisfaction. Physical therapy for decrease pain and increase function was ordered Medically necessary to return her to the ability to achieve all her activities of daily living. Activity modification including decreased for prevention of abduction of her shoulder. Patient was given home exercises as approved by [employer] and works well held directed toward this particular process. pendulum exercises were discussed.  Intermittent use of acetaminophen 500 mg 2 tablets t.i.d. p.r.n. mild to moderate pain, ibuprofen 600 mg t.i.d. p.r.n. severe pain with food or milk if there is no medical contraindication. Home exercise including stretching on a daily basis for 20-30 minutes was recommended. Heat, ice, topical were discussed as needed. Continue followup with shoulder specialty here at [facility].  follow up in 3 months or sooner if his neck pain becomes re re exacerbated. Consider trigger point injections if physical therapy isn't sufficient in controlling muscular neck pain.

## 2022-08-19 NOTE — HISTORY OF PRESENT ILLNESS
[de-identified] : Status post two-level ACDF from November 2021.  Patient complains of focal left shoulder pain on lifting.  Seeing Dr Lopez.  She had a subacromial injection a few weeks ago which did not decrease her pain.  MAURICIO Molina continues to have pain 7/10 at worst 3/10 at best worse with abduction and flexion of her shoulder. She is taking anti-inflammatories which only helped being temporarily. She did physical therapy x6 visits but states that she continues to have pain which is decreasing her ability to achieve her activities of daily living\par past medical service she'll surgical and allergy history was reviewed

## 2022-08-19 NOTE — HISTORY OF PRESENT ILLNESS
[de-identified] : 41 year old F S/p 2 level ACDF presents for follow up of left shoulder pain. Patient states there is significant shoulder pain with forward flexion and abduction. States activity, such as her work, makes the shoulder pain worse. PT for her neck and left shoulder has been helping but she still has significant pain about the shoulder that is a 6/10 daily. Alleviating factors include rest. No radiculopathy down the arms or legs, no weakness. Neck pain is resolved.  [Ataxia] : no ataxia [Incontinence] : no incontinence [Loss of Dexterity] : good dexterity [Urinary Ret.] : no urinary retention

## 2022-08-19 NOTE — ADDENDUM
[FreeTextEntry1] : Documented by Mk Blanc acting as a scribe for Rosalva Avelar on 05/13/2022 . All medical record entries made by the Scribe were at my, Rosalva Avelar , direction and personally dictated by me on 05/13/2022 . I have reviewed the chart and agree that the record accurately reflects my personal performance of the history, physical exam, assessment and plan. I have also personally directed, reviewed, and agreed with the chart.

## 2022-08-19 NOTE — PHYSICAL EXAM
[Poor Appearance] : well-appearing [Acute Distress] : not in acute distress [Obese] : not obese [de-identified] : CONSTITUTIONAL: Patient is a very pleasant individual who is well-nourished and appears stated age. \par PSYCHIATRIC: Alert and oriented times three and in no apparent distress, and participates with orthopedic evaluation well.\par HEAD: Atraumatic and nonsyndromic in appearance.\par EENT: No thyromegaly, EOMI.\par RESPIRATORY: Respiratory rate is regular, not dyspneic on examination.\par LYMPHATICS: There is no cervical or axillary lymphadenopathy.\par INTEGUMENTARY: Skin is clean, dry, and intact about the bilateral upper extremities and cervical spine. \par VASCULAR: There is brisk capillary refill about the bilateral upper extremities and radial pulses are 2/4. \par NEUROLOGIC: Negative L'hirmitte, negative Spurling’s sign. There are no pathologic reflexes. Positive Phalen’s test and Tinel's sign. Deep tendon reflexes are well-maintained at +2/4 of the bilateral upper extremities and are symmetric.\par MUSCULOSKELETAL: There is no visible muscular atrophy. Manual motor strength is well maintained in the bilateral upper extremities. Cervical range of motion is well maintained. The patient ambulates in a non-myelopathic manner. Normal secondary orthopaedic exam of right shoulder, elbows and hands. Elbow flexion and extension, wrist extension, finger flexion and abduction are well maintained. Posterior cervical myositis. Subacromial tenderness on the left, + Hawkin's and + impingement sign. Pain on abduction and flexion of the left shoulder.  [de-identified] : Ap and lateral views of the cervical spine taken 12/07/2021 demonstrates S/p 2 level ACDF. surgical implants appear well positioned. \par \par AP and Lateral views of the cervical spine taken 03/18/2022 demonstrates S/p 2 level ACDF, no changes in implant position. \par \par AP and Lateral views of the cervical spine taken 05/13/2022 demonstrates S/p 2 level ACDF, surgical implants appear well positioned. \par \par Left shoulder x ray shows mild DJD

## 2022-08-19 NOTE — DISCUSSION/SUMMARY
[Medication Risks Reviewed] : Medication risks reviewed [de-identified] : We talked about the nature of the condition and treatment options. Anticipatory guidance regarding subacromial tenderness was given. Patient has been referred to Dr. Claudio Lopez in regard to her left shoulder complaints. A left shoulder MRI has been ordered and is medically necessary due to persistent pain of the left shoulder, failure of conservative measures such as neck and shoulder focused physical therapy since 03- twice weekly with no improvement in pain, and to assess for surgical indications. MRI will help guide treatment plan, possible surgical intervention vs injection therapy with pain management. The patient will follow up after the MRI results have been obtained. I advised the patient on antiinflammatory use such as ibuprofen 600 MG BID / PRN pain not to exceed 1200MG daily with food. Patient will follow up in three months for repeat clinical assessment. \par \par Prior to appointment and during encounter with patient extensive medical records were reviewed including but not limited to, hospital records, out patient records, imaging results, and lab data. During this appointment the patient was examined, diagnoses were discussed and explained in a face to face manner. In addition extensive time was spent reviewing aforementioned diagnostic studies. Counseling including abnormal image results, differential diagnoses, treatment options, risk and benefits, lifestyle changes, current condition, and current medications was performed. Patient's comments, questions, and concerns were address and patient verbalized understanding. Based on this patient's presentation at our office, which is an orthopedic spine surgeon's office, this patient inherently / intrinsically has a risk, however minute, of developing  issues such as Cauda equina syndrome, bowel and bladder changes, or progression of motor or neurological deficits such as paralysis which may be permanent.

## 2022-08-19 NOTE — PHYSICAL EXAM
[de-identified] : CONSTITUTIONAL:  Patient is a very pleasant individual who is well-nourished and appears stated age. \par PSYCHIATRIC:  Alert and oriented times three and in no apparent distress, and participates with orthopedic evaluation well.\par HEAD:  Atraumatic and  nonsyndromic in appearance.\par EENT: No thyromegaly, EOMI.\par RESPIRATORY:  Respiratory rate is regular, not dyspneic on examination.\par LYMPHATICS:  There is no cervical or axillary lymphadenopathy.\par INTEGUMENTARY:  Skin is clean, dry, and intact about the bilateral upper extremities and cervical spine. \par VASCULAR:   There is brisk capillary refill about the bilateral upper extremities and radial pulses are 2/4. \par NEUROLOGIC:  Negative L'hirmitte, negative Spurling's sign. There are no pathologic reflexes. There is no decrease in sensation of the bilateral upper extremities on Wartenberg pinwheel examination.  Deep tendon reflexes are well-maintained at +2/4 of the bilateral upper extremities and are symmetric.\par MUSCULOSKELETAL:  There is no visible muscular atrophy.  Manual motor strength is well maintained in the bilateral upper extremities.  Cervical range of motion is well maintained.  The patient ambulates in a non-myelopathic manner. Normal secondary orthopaedic exam of bilateral elbows and hands.  Elbow flexion and extension, wrist extension, finger flexion and abduction are well maintained.\par Examined Ia by pain on abduction and flexion of the left shoulder as well as tenderness to the subacromial bursa on the left.  cervical myositis, tenderness to palpation of the cervical paraspinals as well as deltoid trapezius bilaterally\par   [de-identified] : MRI of the shoulder has been reviewed from [facility] imaging dated May 23, 2022 demonstrates mild to moderate supraspinatus and infraspinatus tendinosis thickening of the joint capsule seen with adhesive capsulitis and thickening of the coracohumeral ligament.\par \par Ap and lateral views of the cervical spine taken 12/07/2021 demonstrates S/p 2 level ACDF. surgical implants appear well positioned. \par \par AP and Lateral views of the cervical spine taken 03/18/2022 demonstrates S/p 2 level ACDF, no changes in implant position. \par \par AP and Lateral views of the cervical spine taken 05/13/2022 demonstrates S/p 2 level ACDF, surgical implants appear well positioned. \par \par Left shoulder x ray shows mild DJD.\par \par X-rays of the cervical spine of been reviewed that shows stable two-level ACDF.

## 2022-08-23 NOTE — PROVIDER CONTACT NOTE (OTHER) - ASSESSMENT
Pt A/O times 4, VVS, back in bed, felling better at this time after Zofran IVP Methotrexate Counseling:  Patient counseled regarding adverse effects of methotrexate including but not limited to nausea, vomiting, abnormalities in liver function tests. Patients may develop mouth sores, rash, diarrhea, and abnormalities in blood counts. The patient understands that monitoring is required including LFT's and blood counts.  There is a rare possibility of scarring of the liver and lung problems that can occur when taking methotrexate. Persistent nausea, loss of appetite, pale stools, dark urine, cough, and shortness of breath should be reported immediately. Patient advised to discontinue methotrexate treatment at least three months before attempting to become pregnant.  I discussed the need for folate supplements while taking methotrexate.  These supplements can decrease side effects during methotrexate treatment. The patient verbalized understanding of the proper use and possible adverse effects of methotrexate.  All of the patient's questions and concerns were addressed.

## 2022-08-26 ENCOUNTER — APPOINTMENT (OUTPATIENT)
Dept: ORTHOPEDIC SURGERY | Facility: CLINIC | Age: 42
End: 2022-08-26

## 2022-08-26 VITALS
WEIGHT: 130 LBS | SYSTOLIC BLOOD PRESSURE: 95 MMHG | DIASTOLIC BLOOD PRESSURE: 60 MMHG | HEIGHT: 63 IN | HEART RATE: 74 BPM | BODY MASS INDEX: 23.04 KG/M2

## 2022-08-26 DIAGNOSIS — M25.512 PAIN IN LEFT SHOULDER: ICD-10-CM

## 2022-08-26 PROCEDURE — 99213 OFFICE O/P EST LOW 20 MIN: CPT

## 2022-09-26 ENCOUNTER — NON-APPOINTMENT (OUTPATIENT)
Age: 42
End: 2022-09-26

## 2022-10-14 ENCOUNTER — APPOINTMENT (OUTPATIENT)
Dept: PHYSICAL MEDICINE AND REHAB | Facility: CLINIC | Age: 42
End: 2022-10-14

## 2022-10-21 ENCOUNTER — APPOINTMENT (OUTPATIENT)
Dept: PHYSICAL MEDICINE AND REHAB | Facility: CLINIC | Age: 42
End: 2022-10-21

## 2022-10-21 VITALS
BODY MASS INDEX: 24.8 KG/M2 | WEIGHT: 140 LBS | SYSTOLIC BLOOD PRESSURE: 113 MMHG | DIASTOLIC BLOOD PRESSURE: 72 MMHG | HEART RATE: 62 BPM | RESPIRATION RATE: 12 BRPM | HEIGHT: 63 IN

## 2022-10-21 DIAGNOSIS — M79.18 MYALGIA, OTHER SITE: ICD-10-CM

## 2022-10-21 DIAGNOSIS — M79.10 MYALGIA, UNSPECIFIED SITE: ICD-10-CM

## 2022-10-21 PROCEDURE — 99213 OFFICE O/P EST LOW 20 MIN: CPT | Mod: 25

## 2022-10-21 PROCEDURE — 20553 NJX 1/MLT TRIGGER POINTS 3/>: CPT

## 2022-10-21 NOTE — PROCEDURE
[de-identified] : Reason for procedure: Myalgia\par \par Procedure: Trigger Point Injections\par Physician: Dr. Gannon\par Medication injected: Lidocaine 1%, 4cc total\par Sedation medications: None\par Estimated blood loss: None\par Complications: None\par \par Technique: R/B/A to trigger point injection reviewed with patient. The patient is agreeable and wishes to proceed.   The patient was placed in prone position and trigger points of the left trapezius, levator scapulae and thoracic paraspinals were identified. The area was prepped in normal sterile fashion with Chloroprep. A 27 gauge 1.25 inch needle was advanced into the palpable trigger points with reproduction of pain. After negative aspiration of heme, the above medications were injected into the trigger areas. Needle was then removed, bandaid placed over injection sites. There was no complications, the patient was provided with post injection instructions.

## 2022-10-21 NOTE — ASSESSMENT
[FreeTextEntry1] : Ms. WILIAM DE LOS SANTOS is a 42 year old female who presents with persistent neck/shoulder/upper back pain, much worse on the left, likely related to underlying myofascial pain. Pain not improved with recent shoulder injection. Denies any red flag signs. Will recommend:\par - TPI done today. Patient tolerated procedure and had some pain relief immediately following the TPI\par -Continue HEP\par - Continue Tylenol PRN\par \par RTC as needed. Patient aware of red flag signs including any changes to their bowel/bladder control, groin numbness or new weakness. Patient knows to seek immediate attention by calling 911 or going to nearest ER if these symptoms appear.

## 2022-10-21 NOTE — PHYSICAL EXAM
[FreeTextEntry1] : PE:\par Constitutional: In NAD, calm and cooperative\par MSK (Neck/Upper back):\par 	Inspection: no gross swelling identified\par 	Palpation: Tenderness of the left trapezius, upper thoracic paraspinals and levator scapulae\par 	ROM: Mild pain at end cervical flexion>extension\par 	Strength: 5/5 strength in bilateral upper extremities\par 	Reflexes: 2+ Biceps/Brachioradialis/patella/achilles reflex bilaterally, Mai’s/clonus negative bilaterally\par 	Sensation: Intact to light touch in bilateral upper extremities\par 	Special tests: Spurling’s test negative bilaterally

## 2022-10-21 NOTE — HISTORY OF PRESENT ILLNESS
[FreeTextEntry1] : Ms. WILIAM DE LOS SANTOS is a 42 year old  female who presents for follow up. At last visit, she was given TPI, ordered a cervical SAJAN and referred to Ortho [facility]. She never underwent the SAJAN but did undergo a ACDF C4-5 and C5-6 with Dr. Perla. She was recently referred back to me by [facility]. She has been taking Tylenol with some relief. Pain is now mostly in her L upper back area\par \par Location:Mainly left upper back, rarely in L neck pain. \par Onset:Pain started about 3 years ago after she fell in the supermarket, surgery did help with her neck pain but she has had muscle type upper back pain since\par Provocation/Palliative:Worse with activity (walking, using L arm) but also present at rest.\par Quality:Sharp, spasm\par Radiation:Down to L shoulder, nothing down arms\par Severity:4/10 right now\par Timing:persistent\par \par No bowel/bladder changes. No groin numbness. \par \par Patient refused an  today.

## 2022-10-24 NOTE — REASON FOR VISIT
Called back MFM office and let them know that I verified with Dr. Buchanan, and she states pt will continue to follow up with our office and no need for further MFM visit. She verbalized understanding.   [Initial Visit] : an initial visit for [FreeTextEntry2] : left shoulder pain

## 2022-11-30 ENCOUNTER — NON-APPOINTMENT (OUTPATIENT)
Age: 42
End: 2022-11-30

## 2022-12-15 ENCOUNTER — APPOINTMENT (OUTPATIENT)
Dept: ORTHOPEDIC SURGERY | Facility: CLINIC | Age: 42
End: 2022-12-15

## 2023-02-02 ENCOUNTER — APPOINTMENT (OUTPATIENT)
Dept: ORTHOPEDIC SURGERY | Facility: CLINIC | Age: 43
End: 2023-02-02
Payer: COMMERCIAL

## 2023-02-02 VITALS
DIASTOLIC BLOOD PRESSURE: 64 MMHG | BODY MASS INDEX: 24.8 KG/M2 | WEIGHT: 140 LBS | HEART RATE: 72 BPM | HEIGHT: 63 IN | SYSTOLIC BLOOD PRESSURE: 94 MMHG

## 2023-02-02 DIAGNOSIS — Z98.1 ARTHRODESIS STATUS: ICD-10-CM

## 2023-02-02 DIAGNOSIS — M75.82 OTHER SHOULDER LESIONS, LEFT SHOULDER: ICD-10-CM

## 2023-02-02 PROCEDURE — 99213 OFFICE O/P EST LOW 20 MIN: CPT

## 2023-02-02 PROCEDURE — 72040 X-RAY EXAM NECK SPINE 2-3 VW: CPT

## 2023-02-02 NOTE — HISTORY OF PRESENT ILLNESS
[de-identified] : ACDF November 2021, neck pain is well controlled.  Denies decrease in dexterity, abnormal penmanship, falling/ataxia.  She does continue to have focal left shoulder pain on abduction and.  She is done many months of physical therapy, has seen upper extremity specialty twice, had an injection with cortisone in her shoulder which lasted only 2 weeks in decreasing her pain.  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  Past medical social family and allergy history was reviewed.

## 2023-02-02 NOTE — DISCUSSION/SUMMARY
[de-identified] : 20 minutes was spent reviewing the x-rays as well as discussing with the patient their clinical presentation, diagnosis and providing education.  Conservative treatment was discussed with the patient at length. Anticipatory guidance regarding disease process left shoulder and biceps tendinitis/tendinopathy, status post ACDF for cervical spondylosis, avoidance of acute exacerbation this was discussed at length and all patients commenting concerns were answered to the patient's satisfaction. Physical therapy for decrease pain and increase function was ordered. Patient was given home exercises as approved by [employer] and works well held directed toward this particular process. Intermittent use of acetaminophen 500 mg 2 tablets t.i.d. p.r.n. mild to moderate pain, ibuprofen 600 mg t.i.d. p.r.n. severe pain with food or milk if there is no medical contraindication. Home exercise including stretching on a daily basis for 20-30 minutes was recommended. Heat, ice, topical were discussed as needed. The patient will followup in 1 year or as needed at which point in time if symptoms of neck discomfort return we will order MRI studies cervical spine to guide treatment plan including possible injection therapy with pain management versus surgical option.  For continued left shoulder pain we did discuss activity modification trying not to do any repetitive overhead activity, no strenuous overhead activity, do daily stretching mild strengthening foam rolling ice heat topicals etc.  Follow-up with shoulder specialty for more definitive management

## 2023-02-07 ENCOUNTER — APPOINTMENT (OUTPATIENT)
Dept: ORTHOPEDIC SURGERY | Facility: CLINIC | Age: 43
End: 2023-02-07

## 2023-04-07 NOTE — ADDENDUM
Please call to let her know that amlodipine was stopped in hospital because her BP was controlled on hydralazine. Amlodipine and hydralazine together can cause increase in edema.    Watch her BP and if BP becomes elevated, please call the office.     Thanks!  RIGO Rodney     [FreeTextEntry1] : Documented by Mk Blanc acting as a scribe for Rosalva Avelar on 12/07/2021 . All medical record entries made by the Scribe were at my, Roaslva Avelar , direction and personally dictated by me on 12/07/2021  . I have reviewed the chart and agree that the record accurately reflects my personal performance of the history, physical exam, assessment and plan. I have also personally directed, reviewed, and agreed with the chart.

## 2023-04-25 ENCOUNTER — NON-APPOINTMENT (OUTPATIENT)
Age: 43
End: 2023-04-25

## 2023-06-28 NOTE — H&P PST ADULT - NS PRO FEM REPRO BREAST EXAM FREQ
Peripheral Block    Patient location during procedure: pre-op   Block not for primary anesthetic.  Reason for block: at surgeon's request and post-op pain management   Post-op Pain Location: right knee   Start time: 6/27/2023 7:00 AM  Timeout: 6/27/2023 7:00 AM   End time: 6/27/2023 7:09 AM    Staffing  Authorizing Provider: Rocky Cherry MD  Performing Provider: Rocky Cherry MD    Preanesthetic Checklist  Completed: patient identified, IV checked, site marked, risks and benefits discussed, surgical consent, monitors and equipment checked, pre-op evaluation and timeout performed  Peripheral Block  Patient position: supine  Prep: ChloraPrep  Patient monitoring: heart rate, cardiac monitor, continuous pulse ox, continuous capnometry and frequent blood pressure checks  Block type: adductor canal  Laterality: right  Injection technique: single shot  Needle  Needle type: Stimuplex   Needle gauge: 21 G  Needle length: 4 in  Needle localization: anatomical landmarks and ultrasound guidance   -ultrasound image captured on disc.  Assessment  Injection assessment: negative aspiration, negative parasthesia and local visualized surrounding nerve  Paresthesia pain: none  Heart rate change: no  Slow fractionated injection: yes  Pain Tolerance: comfortable throughout block and no complaints      Additional Notes  VSS.  [facility] RN monitoring vitals throughout procedure.  Patient tolerated procedure well.              normal per pt./monthly

## 2023-08-15 ENCOUNTER — NON-APPOINTMENT (OUTPATIENT)
Age: 43
End: 2023-08-15

## 2023-12-12 ENCOUNTER — NON-APPOINTMENT (OUTPATIENT)
Age: 43
End: 2023-12-12

## 2024-10-21 ENCOUNTER — NON-APPOINTMENT (OUTPATIENT)
Age: 44
End: 2024-10-21

## 2024-10-21 DIAGNOSIS — M72.2 PLANTAR FASCIAL FIBROMATOSIS: ICD-10-CM

## 2024-10-21 DIAGNOSIS — B35.1 TINEA UNGUIUM: ICD-10-CM

## 2024-10-21 DIAGNOSIS — M21.6X1 OTHER ACQUIRED DEFORMITIES OF RIGHT FOOT: ICD-10-CM

## 2024-10-21 RX ORDER — TAVABOROLE 43.5 MG/ML
5 SOLUTION TOPICAL
Refills: 0 | Status: ACTIVE | COMMUNITY

## 2024-10-21 RX ORDER — TERBINAFINE HYDROCHLORIDE 250 MG/1
250 TABLET ORAL
Refills: 0 | Status: ACTIVE | COMMUNITY

## 2024-10-30 ENCOUNTER — APPOINTMENT (OUTPATIENT)
Age: 44
End: 2024-10-30
Payer: COMMERCIAL

## 2024-10-30 DIAGNOSIS — L60.0 INGROWING NAIL: ICD-10-CM

## 2024-10-30 DIAGNOSIS — L03.031 CELLULITIS OF RIGHT TOE: ICD-10-CM

## 2024-10-30 DIAGNOSIS — M79.671 PAIN IN RIGHT FOOT: ICD-10-CM

## 2024-10-30 PROCEDURE — 99204 OFFICE O/P NEW MOD 45 MIN: CPT | Mod: 25

## 2024-10-30 PROCEDURE — 11750 EXCISION NAIL&NAIL MATRIX: CPT | Mod: T5

## 2024-11-14 ENCOUNTER — APPOINTMENT (OUTPATIENT)
Age: 44
End: 2024-11-14
Payer: COMMERCIAL

## 2024-11-14 DIAGNOSIS — L60.0 INGROWING NAIL: ICD-10-CM

## 2024-11-14 DIAGNOSIS — M79.671 PAIN IN RIGHT FOOT: ICD-10-CM

## 2024-11-14 DIAGNOSIS — L03.032 CELLULITIS OF LEFT TOE: ICD-10-CM

## 2024-11-14 DIAGNOSIS — M79.672 PAIN IN LEFT FOOT: ICD-10-CM

## 2024-11-14 DIAGNOSIS — L03.031 CELLULITIS OF RIGHT TOE: ICD-10-CM

## 2024-11-14 PROCEDURE — 99214 OFFICE O/P EST MOD 30 MIN: CPT | Mod: 25

## 2024-11-14 PROCEDURE — 11750 EXCISION NAIL&NAIL MATRIX: CPT | Mod: Q8

## 2024-12-09 ENCOUNTER — NON-APPOINTMENT (OUTPATIENT)
Age: 44
End: 2024-12-09

## 2025-05-06 ENCOUNTER — APPOINTMENT (OUTPATIENT)
Age: 45
End: 2025-05-06
Payer: COMMERCIAL

## 2025-05-06 DIAGNOSIS — M79.671 PAIN IN RIGHT FOOT: ICD-10-CM

## 2025-05-06 DIAGNOSIS — L03.031 CELLULITIS OF RIGHT TOE: ICD-10-CM

## 2025-05-06 DIAGNOSIS — L60.0 INGROWING NAIL: ICD-10-CM

## 2025-05-06 PROCEDURE — 11750 EXCISION NAIL&NAIL MATRIX: CPT | Mod: T5

## 2025-05-06 PROCEDURE — 99213 OFFICE O/P EST LOW 20 MIN: CPT | Mod: 25

## 2025-05-23 ENCOUNTER — APPOINTMENT (OUTPATIENT)
Age: 45
End: 2025-05-23